# Patient Record
Sex: FEMALE | Race: WHITE | NOT HISPANIC OR LATINO | URBAN - METROPOLITAN AREA
[De-identification: names, ages, dates, MRNs, and addresses within clinical notes are randomized per-mention and may not be internally consistent; named-entity substitution may affect disease eponyms.]

---

## 2017-02-13 ENCOUNTER — APPOINTMENT (RX ONLY)
Dept: URBAN - METROPOLITAN AREA CLINIC 23 | Facility: CLINIC | Age: 82
Setting detail: DERMATOLOGY
End: 2017-02-13

## 2017-02-13 DIAGNOSIS — Z85.828 PERSONAL HISTORY OF OTHER MALIGNANT NEOPLASM OF SKIN: ICD-10-CM

## 2017-02-13 DIAGNOSIS — L82.0 INFLAMED SEBORRHEIC KERATOSIS: ICD-10-CM

## 2017-02-13 DIAGNOSIS — Z86.007 PERSONAL HISTORY OF IN-SITU NEOPLASM OF SKIN: ICD-10-CM

## 2017-02-13 PROBLEM — L85.3 XEROSIS CUTIS: Status: ACTIVE | Noted: 2017-02-13

## 2017-02-13 PROBLEM — E78.5 HYPERLIPIDEMIA, UNSPECIFIED: Status: ACTIVE | Noted: 2017-02-13

## 2017-02-13 PROBLEM — C18.9 MALIGNANT NEOPLASM OF COLON, UNSPECIFIED: Status: ACTIVE | Noted: 2017-02-13

## 2017-02-13 PROBLEM — D04.61 CARCINOMA IN SITU OF SKIN OF RIGHT UPPER LIMB, INCLUDING SHOULDER: Status: ACTIVE | Noted: 2017-02-13

## 2017-02-13 PROCEDURE — ? DEFER

## 2017-02-13 PROCEDURE — 99213 OFFICE O/P EST LOW 20 MIN: CPT

## 2017-02-13 ASSESSMENT — LOCATION ZONE DERM
LOCATION ZONE: LEG
LOCATION ZONE: SCALP

## 2017-02-13 ASSESSMENT — LOCATION DETAILED DESCRIPTION DERM
LOCATION DETAILED: LEFT MEDIAL FRONTAL SCALP
LOCATION DETAILED: LEFT DISTAL PRETIBIAL REGION
LOCATION DETAILED: LEFT ANTERIOR LATERAL DISTAL THIGH
LOCATION DETAILED: LEFT PROXIMAL PRETIBIAL REGION

## 2017-02-13 ASSESSMENT — LOCATION SIMPLE DESCRIPTION DERM
LOCATION SIMPLE: LEFT PRETIBIAL REGION
LOCATION SIMPLE: LEFT SCALP
LOCATION SIMPLE: LEFT THIGH

## 2017-02-13 NOTE — PROCEDURE: DEFER
Detail Level: Detailed
Other Procedure: 1.2cm
Other Procedure: 1.0cm
Procedure To Be Performed At Next Visit: Biopsy by shave method

## 2017-03-27 ENCOUNTER — APPOINTMENT (RX ONLY)
Dept: URBAN - METROPOLITAN AREA CLINIC 23 | Facility: CLINIC | Age: 82
Setting detail: DERMATOLOGY
End: 2017-03-27

## 2017-03-27 DIAGNOSIS — L82.0 INFLAMED SEBORRHEIC KERATOSIS: ICD-10-CM

## 2017-03-27 DIAGNOSIS — L82.1 OTHER SEBORRHEIC KERATOSIS: ICD-10-CM

## 2017-03-27 PROBLEM — L20.84 INTRINSIC (ALLERGIC) ECZEMA: Status: ACTIVE | Noted: 2017-03-27

## 2017-03-27 PROBLEM — Z85.828 PERSONAL HISTORY OF OTHER MALIGNANT NEOPLASM OF SKIN: Status: ACTIVE | Noted: 2017-03-27

## 2017-03-27 PROBLEM — E78.5 HYPERLIPIDEMIA, UNSPECIFIED: Status: ACTIVE | Noted: 2017-03-27

## 2017-03-27 PROBLEM — D04.61 CARCINOMA IN SITU OF SKIN OF RIGHT UPPER LIMB, INCLUDING SHOULDER: Status: ACTIVE | Noted: 2017-03-27

## 2017-03-27 PROCEDURE — ? SHAVE REMOVAL

## 2017-03-27 PROCEDURE — ? COUNSELING

## 2017-03-27 PROCEDURE — 11302 SHAVE SKIN LESION 1.1-2.0 CM: CPT

## 2017-03-27 PROCEDURE — ? SHAVE REMOVAL AND DESTRUCTION

## 2017-03-27 PROCEDURE — 17262 DSTRJ MAL LES T/A/L 1.1-2.0: CPT

## 2017-03-27 PROCEDURE — 99213 OFFICE O/P EST LOW 20 MIN: CPT | Mod: 25

## 2017-03-27 ASSESSMENT — LOCATION ZONE DERM
LOCATION ZONE: LEG
LOCATION ZONE: TRUNK

## 2017-03-27 ASSESSMENT — LOCATION DETAILED DESCRIPTION DERM
LOCATION DETAILED: LEFT ANTERIOR LATERAL DISTAL THIGH
LOCATION DETAILED: LEFT MID-UPPER BACK

## 2017-03-27 ASSESSMENT — LOCATION SIMPLE DESCRIPTION DERM
LOCATION SIMPLE: LEFT UPPER BACK
LOCATION SIMPLE: LEFT THIGH

## 2017-03-27 NOTE — PROCEDURE: SHAVE REMOVAL AND DESTRUCTION
Bill As?: Note: Bill Malignant Destruction If Path Confirms Malignant Lesion. Only Bill As Shave Removal If Path Comes Back Benign. Do Not Bill Shave Removal On Malignant Lesions.: Malignant Destruction
Detail Level: Detailed
Anesthesia Volume In Cc: 0.5
Anesthesia Type: 1% lidocaine with epinephrine and a 1:10 solution of 8.4% sodium bicarbonate
Bill For Surgical Tray: no
Cautery Type: electrodesiccation
Size Of Lesion In Cm: 1
Number Of Curettages: 4
Notification Instructions: Patient will be notified of biopsy results. However, patient instructed to call the office if not contacted within 2 weeks.
Consent: Written consent was obtained and risks were reviewed including but not limited to scarring, infection, bleeding, scabbing, incomplete removal, nerve damage and allergy to anesthesia.
Size After Destruction (Required For Destruction Billing): 1.2
Hemostasis: Aluminum Chloride and Electrocautery
Accession #: [id number]
Dressing: [email or web address]
Post-Care Instructions: I reviewed with the patient in detail post-care instructions. Patient is to keep the biopsy site dry overnight, and then apply bacitracin twice daily until healed. Patient may apply hydrogen peroxide soaks to remove any crusting.
Billing Type: Third-Party Bill
Wound Care: Vaseline

## 2017-03-27 NOTE — PROCEDURE: SHAVE REMOVAL
Post-Care Instructions: I reviewed with the patient in detail post-care instructions. Patient is to keep the biopsy site dry overnight, and then apply bacitracin twice daily until healed. Patient may apply hydrogen peroxide soaks to remove any crusting.
Accession #: [id number]
Wound Care: Gelfoam
Notification Instructions: Patient will be notified of biopsy results.
Bill For Surgical Tray: no
Medical Necessity Clause: This procedure was medically necessary because the lesion that was treated was: rubbed by clothing
Detail Level: Detailed
Medical Necessity Information: It is in your best interest to select a reason for this procedure from the list below. All of these items fulfill various CMS LCD requirements except the new and changing color options.
Billing Type: Third-Party Bill
Hemostasis: Aluminum Chloride and Electrocautery
Anesthesia Type: 1% lidocaine with 1:100,000 epinephrine and a 1:10 solution of 8.4% sodium bicarbonate
X Size Of Lesion In Cm (Optional): 0
Anesthesia Volume In Cc: 0.5
Biopsy Method: 15 blade
Consent was obtained from the patient. The risks and benefits to therapy were discussed in detail. Specifically, the risks of infection, scarring, bleeding, prolonged wound healing, incomplete removal, allergy to anesthesia, nerve injury and recurrence were addressed. Prior to the procedure, the treatment site was clearly identified and confirmed by the patient.
Size Of Lesion In Cm (Required): 1.2

## 2017-04-24 ENCOUNTER — APPOINTMENT (RX ONLY)
Dept: URBAN - METROPOLITAN AREA CLINIC 23 | Facility: CLINIC | Age: 82
Setting detail: DERMATOLOGY
End: 2017-04-24

## 2017-04-24 DIAGNOSIS — Z85.828 PERSONAL HISTORY OF OTHER MALIGNANT NEOPLASM OF SKIN: ICD-10-CM

## 2017-04-24 DIAGNOSIS — L82.0 INFLAMED SEBORRHEIC KERATOSIS: ICD-10-CM

## 2017-04-24 DIAGNOSIS — Z86.007 PERSONAL HISTORY OF IN-SITU NEOPLASM OF SKIN: ICD-10-CM

## 2017-04-24 PROBLEM — E78.5 HYPERLIPIDEMIA, UNSPECIFIED: Status: ACTIVE | Noted: 2017-04-24

## 2017-04-24 PROBLEM — L85.3 XEROSIS CUTIS: Status: ACTIVE | Noted: 2017-04-24

## 2017-04-24 PROCEDURE — ? OTHER

## 2017-04-24 PROCEDURE — 99213 OFFICE O/P EST LOW 20 MIN: CPT

## 2017-04-24 ASSESSMENT — LOCATION DETAILED DESCRIPTION DERM
LOCATION DETAILED: LEFT PROXIMAL PRETIBIAL REGION
LOCATION DETAILED: RIGHT ANTERIOR PROXIMAL UPPER ARM
LOCATION DETAILED: LEFT MEDIAL FRONTAL SCALP
LOCATION DETAILED: LEFT ANTERIOR LATERAL DISTAL THIGH
LOCATION DETAILED: LEFT DISTAL PRETIBIAL REGION

## 2017-04-24 ASSESSMENT — LOCATION ZONE DERM
LOCATION ZONE: ARM
LOCATION ZONE: SCALP
LOCATION ZONE: LEG

## 2017-04-24 ASSESSMENT — LOCATION SIMPLE DESCRIPTION DERM
LOCATION SIMPLE: LEFT SCALP
LOCATION SIMPLE: LEFT PRETIBIAL REGION
LOCATION SIMPLE: LEFT THIGH
LOCATION SIMPLE: RIGHT UPPER ARM

## 2017-04-24 NOTE — PROCEDURE: MIPS QUALITY
Quality 111:Pneumonia Vaccination Status For Older Adults: Pneumococcal Vaccination Previously Received
Quality 130: Documentation Of Current Medications In The Medical Record: Current Medications Documented
Detail Level: Detailed
Quality 110: Preventive Care And Screening: Influenza Immunization: Influenza Immunization Administered during Influenza season

## 2017-04-24 NOTE — PROCEDURE: REASSURANCE
Additional Notes (Optional): Bx site proven Irritated SK. Bx site healing well
Detail Level: Detailed

## 2017-04-24 NOTE — PROCEDURE: OTHER
Note Text (......Xxx Chief Complaint.): This diagnosis correlates with the
Other (Free Text): Bx site proven SCCis. Bx site healing well
Detail Level: Detailed

## 2017-08-03 ENCOUNTER — APPOINTMENT (RX ONLY)
Dept: URBAN - METROPOLITAN AREA CLINIC 23 | Facility: CLINIC | Age: 82
Setting detail: DERMATOLOGY
End: 2017-08-03

## 2017-08-03 DIAGNOSIS — L57.0 ACTINIC KERATOSIS: ICD-10-CM

## 2017-08-03 DIAGNOSIS — L20.89 OTHER ATOPIC DERMATITIS: ICD-10-CM | Status: WELL CONTROLLED

## 2017-08-03 DIAGNOSIS — Z85.828 PERSONAL HISTORY OF OTHER MALIGNANT NEOPLASM OF SKIN: ICD-10-CM

## 2017-08-03 DIAGNOSIS — Z86.007 PERSONAL HISTORY OF IN-SITU NEOPLASM OF SKIN: ICD-10-CM

## 2017-08-03 PROBLEM — L20.84 INTRINSIC (ALLERGIC) ECZEMA: Status: ACTIVE | Noted: 2017-08-03

## 2017-08-03 PROBLEM — C18.9 MALIGNANT NEOPLASM OF COLON, UNSPECIFIED: Status: ACTIVE | Noted: 2017-08-03

## 2017-08-03 PROBLEM — H91.90 UNSPECIFIED HEARING LOSS, UNSPECIFIED EAR: Status: ACTIVE | Noted: 2017-08-03

## 2017-08-03 PROCEDURE — ? TREATMENT REGIMEN

## 2017-08-03 PROCEDURE — ? COUNSELING

## 2017-08-03 PROCEDURE — 17000 DESTRUCT PREMALG LESION: CPT

## 2017-08-03 PROCEDURE — 17003 DESTRUCT PREMALG LES 2-14: CPT

## 2017-08-03 PROCEDURE — 99213 OFFICE O/P EST LOW 20 MIN: CPT | Mod: 25

## 2017-08-03 PROCEDURE — ? LIQUID NITROGEN

## 2017-08-03 PROCEDURE — ? PRESCRIPTION

## 2017-08-03 RX ORDER — TRIAMCINOLONE ACETONIDE 1 MG/G
CREAM TOPICAL
Qty: 1 | Refills: 2 | Status: ERX

## 2017-08-03 ASSESSMENT — LOCATION DETAILED DESCRIPTION DERM
LOCATION DETAILED: LEFT LATERAL DISTAL PRETIBIAL REGION
LOCATION DETAILED: LEFT MEDIAL FRONTAL SCALP
LOCATION DETAILED: LEFT PROXIMAL PRETIBIAL REGION
LOCATION DETAILED: RIGHT DISTAL PRETIBIAL REGION
LOCATION DETAILED: RIGHT ANTERIOR PROXIMAL UPPER ARM
LOCATION DETAILED: LEFT DISTAL PRETIBIAL REGION
LOCATION DETAILED: LEFT LATERAL PROXIMAL PRETIBIAL REGION
LOCATION DETAILED: RIGHT PROXIMAL RADIAL DORSAL FOREARM

## 2017-08-03 ASSESSMENT — LOCATION ZONE DERM
LOCATION ZONE: SCALP
LOCATION ZONE: ARM
LOCATION ZONE: LEG

## 2017-08-03 ASSESSMENT — LOCATION SIMPLE DESCRIPTION DERM
LOCATION SIMPLE: RIGHT PRETIBIAL REGION
LOCATION SIMPLE: RIGHT UPPER ARM
LOCATION SIMPLE: LEFT SCALP
LOCATION SIMPLE: LEFT PRETIBIAL REGION
LOCATION SIMPLE: RIGHT FOREARM

## 2017-08-03 NOTE — PROCEDURE: TREATMENT REGIMEN
Detail Level: Detailed
Samples Given: Eczema calming body moisturizer apply BID PRN
Continue Regimen: Triamcinolone cream (apply BID, pen, to affected areas on lower legs)

## 2018-02-21 ENCOUNTER — APPOINTMENT (RX ONLY)
Dept: URBAN - METROPOLITAN AREA CLINIC 23 | Facility: CLINIC | Age: 83
Setting detail: DERMATOLOGY
End: 2018-02-21

## 2018-02-21 DIAGNOSIS — L20.89 OTHER ATOPIC DERMATITIS: ICD-10-CM

## 2018-02-21 DIAGNOSIS — L82.1 OTHER SEBORRHEIC KERATOSIS: ICD-10-CM

## 2018-02-21 DIAGNOSIS — Z86.007 PERSONAL HISTORY OF IN-SITU NEOPLASM OF SKIN: ICD-10-CM

## 2018-02-21 DIAGNOSIS — L57.0 ACTINIC KERATOSIS: ICD-10-CM

## 2018-02-21 DIAGNOSIS — Z85.828 PERSONAL HISTORY OF OTHER MALIGNANT NEOPLASM OF SKIN: ICD-10-CM

## 2018-02-21 PROBLEM — L20.84 INTRINSIC (ALLERGIC) ECZEMA: Status: ACTIVE | Noted: 2018-02-21

## 2018-02-21 PROBLEM — H91.90 UNSPECIFIED HEARING LOSS, UNSPECIFIED EAR: Status: ACTIVE | Noted: 2018-02-21

## 2018-02-21 PROBLEM — E78.5 HYPERLIPIDEMIA, UNSPECIFIED: Status: ACTIVE | Noted: 2018-02-21

## 2018-02-21 PROBLEM — D23.5 OTHER BENIGN NEOPLASM OF SKIN OF TRUNK: Status: ACTIVE | Noted: 2018-02-21

## 2018-02-21 PROCEDURE — 17003 DESTRUCT PREMALG LES 2-14: CPT

## 2018-02-21 PROCEDURE — 17000 DESTRUCT PREMALG LESION: CPT

## 2018-02-21 PROCEDURE — ? COUNSELING

## 2018-02-21 PROCEDURE — ? PRESCRIPTION

## 2018-02-21 PROCEDURE — 99214 OFFICE O/P EST MOD 30 MIN: CPT | Mod: 25

## 2018-02-21 PROCEDURE — ? LIQUID NITROGEN

## 2018-02-21 RX ORDER — TRIAMCINOLONE ACETONIDE 1 MG/G
CREAM TOPICAL
Qty: 1 | Refills: 2 | Status: ERX

## 2018-02-21 ASSESSMENT — LOCATION ZONE DERM
LOCATION ZONE: TRUNK
LOCATION ZONE: SCALP
LOCATION ZONE: ARM
LOCATION ZONE: HAND
LOCATION ZONE: LEG

## 2018-02-21 ASSESSMENT — LOCATION SIMPLE DESCRIPTION DERM
LOCATION SIMPLE: RIGHT SHOULDER
LOCATION SIMPLE: RIGHT UPPER ARM
LOCATION SIMPLE: CHEST
LOCATION SIMPLE: RIGHT UPPER BACK
LOCATION SIMPLE: RIGHT PRETIBIAL REGION
LOCATION SIMPLE: LEFT SCALP
LOCATION SIMPLE: LEFT UPPER BACK
LOCATION SIMPLE: LEFT PRETIBIAL REGION
LOCATION SIMPLE: LEFT HAND

## 2018-02-21 ASSESSMENT — LOCATION DETAILED DESCRIPTION DERM
LOCATION DETAILED: LEFT SUPERIOR UPPER BACK
LOCATION DETAILED: LEFT MEDIAL FRONTAL SCALP
LOCATION DETAILED: RIGHT PROXIMAL PRETIBIAL REGION
LOCATION DETAILED: RIGHT ANTERIOR PROXIMAL UPPER ARM
LOCATION DETAILED: RIGHT ANTERIOR SHOULDER
LOCATION DETAILED: LEFT DISTAL PRETIBIAL REGION
LOCATION DETAILED: LEFT RADIAL DORSAL HAND
LOCATION DETAILED: RIGHT DISTAL PRETIBIAL REGION
LOCATION DETAILED: STERNAL NOTCH
LOCATION DETAILED: RIGHT INFERIOR UPPER BACK
LOCATION DETAILED: LEFT PROXIMAL PRETIBIAL REGION

## 2018-02-21 NOTE — PROCEDURE: LIQUID NITROGEN
Detail Level: Detailed
Duration Of Freeze Thaw-Cycle (Seconds): 20
Number Of Freeze-Thaw Cycles: 1 freeze-thaw cycle
Render Post-Care Instructions In Note?: yes
Consent: The patient's consent was obtained including but not limited to risks of crusting, scabbing, blistering, scarring, darker or lighter pigmentary change, recurrence, incomplete removal and infection.
Post-Care Instructions: I reviewed with the patient in detail post-care instructions. Patient is to wear sunprotection, and avoid picking at any of the treated lesions. Pt may apply Vaseline to crusted or scabbing areas.

## 2019-02-20 ENCOUNTER — APPOINTMENT (RX ONLY)
Dept: URBAN - METROPOLITAN AREA CLINIC 23 | Facility: CLINIC | Age: 84
Setting detail: DERMATOLOGY
End: 2019-02-20

## 2019-02-20 DIAGNOSIS — L82.1 OTHER SEBORRHEIC KERATOSIS: ICD-10-CM

## 2019-02-20 DIAGNOSIS — L57.0 ACTINIC KERATOSIS: ICD-10-CM

## 2019-02-20 DIAGNOSIS — Z85.828 PERSONAL HISTORY OF OTHER MALIGNANT NEOPLASM OF SKIN: ICD-10-CM

## 2019-02-20 DIAGNOSIS — L71.8 OTHER ROSACEA: ICD-10-CM

## 2019-02-20 PROBLEM — C18.9 MALIGNANT NEOPLASM OF COLON, UNSPECIFIED: Status: ACTIVE | Noted: 2019-02-20

## 2019-02-20 PROBLEM — L20.84 INTRINSIC (ALLERGIC) ECZEMA: Status: ACTIVE | Noted: 2019-02-20

## 2019-02-20 PROBLEM — D23.5 OTHER BENIGN NEOPLASM OF SKIN OF TRUNK: Status: ACTIVE | Noted: 2019-02-20

## 2019-02-20 PROCEDURE — ? PRESCRIPTION

## 2019-02-20 PROCEDURE — ? DIAGNOSIS COMMENT

## 2019-02-20 PROCEDURE — ? COUNSELING

## 2019-02-20 PROCEDURE — ? LIQUID NITROGEN

## 2019-02-20 PROCEDURE — 99213 OFFICE O/P EST LOW 20 MIN: CPT | Mod: 25

## 2019-02-20 PROCEDURE — ? TREATMENT REGIMEN

## 2019-02-20 PROCEDURE — 17000 DESTRUCT PREMALG LESION: CPT

## 2019-02-20 PROCEDURE — 17003 DESTRUCT PREMALG LES 2-14: CPT

## 2019-02-20 RX ORDER — METRONIDAZOLE 7.5 MG/G
CREAM TOPICAL
Qty: 1 | Refills: 11 | Status: ERX | COMMUNITY
Start: 2019-02-20

## 2019-02-20 RX ADMIN — METRONIDAZOLE: 7.5 CREAM TOPICAL at 14:30

## 2019-02-20 ASSESSMENT — LOCATION SIMPLE DESCRIPTION DERM
LOCATION SIMPLE: LEFT CHEEK
LOCATION SIMPLE: CHEST
LOCATION SIMPLE: RIGHT UPPER BACK
LOCATION SIMPLE: LEFT FOREARM
LOCATION SIMPLE: RIGHT UPPER ARM
LOCATION SIMPLE: LEFT UPPER BACK
LOCATION SIMPLE: LEFT SCALP
LOCATION SIMPLE: RIGHT SHOULDER
LOCATION SIMPLE: LEFT PRETIBIAL REGION

## 2019-02-20 ASSESSMENT — LOCATION DETAILED DESCRIPTION DERM
LOCATION DETAILED: STERNAL NOTCH
LOCATION DETAILED: LEFT SUPERIOR UPPER BACK
LOCATION DETAILED: LEFT PROXIMAL PRETIBIAL REGION
LOCATION DETAILED: RIGHT ANTERIOR PROXIMAL UPPER ARM
LOCATION DETAILED: LEFT MEDIAL FRONTAL SCALP
LOCATION DETAILED: RIGHT INFERIOR UPPER BACK
LOCATION DETAILED: RIGHT ANTERIOR SHOULDER
LOCATION DETAILED: LEFT DISTAL PRETIBIAL REGION
LOCATION DETAILED: LEFT DISTAL ULNAR DORSAL FOREARM
LOCATION DETAILED: LEFT MEDIAL MALAR CHEEK

## 2019-02-20 ASSESSMENT — LOCATION ZONE DERM
LOCATION ZONE: TRUNK
LOCATION ZONE: LEG
LOCATION ZONE: ARM
LOCATION ZONE: FACE
LOCATION ZONE: SCALP

## 2019-02-20 NOTE — PROCEDURE: DIAGNOSIS COMMENT
Detail Level: Simple
Comment: Advised to monitor area on left cheek and if not resolved with cryotherapy then RTC

## 2019-02-20 NOTE — PROCEDURE: LIQUID NITROGEN
Render Post-Care Instructions In Note?: yes
Detail Level: Detailed
Duration Of Freeze Thaw-Cycle (Seconds): 20
Consent: The patient's consent was obtained including but not limited to risks of crusting, scabbing, blistering, scarring, darker or lighter pigmentary change, recurrence, incomplete removal and infection.
Number Of Freeze-Thaw Cycles: 1 freeze-thaw cycle
Post-Care Instructions: I reviewed with the patient in detail post-care instructions. Patient is to wear sunprotection, and avoid picking at any of the treated lesions. Pt may apply Vaseline to crusted or scabbing areas.

## 2020-02-20 ENCOUNTER — APPOINTMENT (RX ONLY)
Dept: URBAN - METROPOLITAN AREA CLINIC 23 | Facility: CLINIC | Age: 85
Setting detail: DERMATOLOGY
End: 2020-02-20

## 2020-02-20 DIAGNOSIS — L82.1 OTHER SEBORRHEIC KERATOSIS: ICD-10-CM

## 2020-02-20 DIAGNOSIS — D18.0 HEMANGIOMA: ICD-10-CM

## 2020-02-20 DIAGNOSIS — L70.8 OTHER ACNE: ICD-10-CM

## 2020-02-20 DIAGNOSIS — Z85.828 PERSONAL HISTORY OF OTHER MALIGNANT NEOPLASM OF SKIN: ICD-10-CM

## 2020-02-20 DIAGNOSIS — L57.0 ACTINIC KERATOSIS: ICD-10-CM

## 2020-02-20 DIAGNOSIS — L30.9 DERMATITIS, UNSPECIFIED: ICD-10-CM

## 2020-02-20 PROBLEM — H91.90 UNSPECIFIED HEARING LOSS, UNSPECIFIED EAR: Status: ACTIVE | Noted: 2020-02-20

## 2020-02-20 PROBLEM — L85.3 XEROSIS CUTIS: Status: ACTIVE | Noted: 2020-02-20

## 2020-02-20 PROBLEM — D18.01 HEMANGIOMA OF SKIN AND SUBCUTANEOUS TISSUE: Status: ACTIVE | Noted: 2020-02-20

## 2020-02-20 PROBLEM — C18.9 MALIGNANT NEOPLASM OF COLON, UNSPECIFIED: Status: ACTIVE | Noted: 2020-02-20

## 2020-02-20 PROBLEM — D23.5 OTHER BENIGN NEOPLASM OF SKIN OF TRUNK: Status: ACTIVE | Noted: 2020-02-20

## 2020-02-20 PROCEDURE — 99214 OFFICE O/P EST MOD 30 MIN: CPT | Mod: 25

## 2020-02-20 PROCEDURE — ? DIAGNOSIS COMMENT

## 2020-02-20 PROCEDURE — ? LIQUID NITROGEN

## 2020-02-20 PROCEDURE — ? OTHER

## 2020-02-20 PROCEDURE — ? COUNSELING

## 2020-02-20 PROCEDURE — ? KOH PREP

## 2020-02-20 PROCEDURE — 17000 DESTRUCT PREMALG LESION: CPT

## 2020-02-20 ASSESSMENT — LOCATION SIMPLE DESCRIPTION DERM
LOCATION SIMPLE: LEFT PRETIBIAL REGION
LOCATION SIMPLE: RIGHT SHOULDER
LOCATION SIMPLE: RIGHT HAND
LOCATION SIMPLE: RIGHT FOOT
LOCATION SIMPLE: RIGHT UPPER ARM
LOCATION SIMPLE: LEFT SCALP
LOCATION SIMPLE: RIGHT PRETIBIAL REGION
LOCATION SIMPLE: RIGHT UPPER BACK
LOCATION SIMPLE: CHEST
LOCATION SIMPLE: ABDOMEN
LOCATION SIMPLE: LEFT UPPER BACK

## 2020-02-20 ASSESSMENT — LOCATION DETAILED DESCRIPTION DERM
LOCATION DETAILED: LEFT SUPERIOR UPPER BACK
LOCATION DETAILED: LEFT MEDIAL FRONTAL SCALP
LOCATION DETAILED: LEFT RIB CAGE
LOCATION DETAILED: STERNAL NOTCH
LOCATION DETAILED: RIGHT RADIAL DORSAL HAND
LOCATION DETAILED: RIGHT DORSAL FOOT
LOCATION DETAILED: RIGHT INFERIOR UPPER BACK
LOCATION DETAILED: LEFT PROXIMAL PRETIBIAL REGION
LOCATION DETAILED: RIGHT PROXIMAL PRETIBIAL REGION
LOCATION DETAILED: RIGHT ANTERIOR SHOULDER
LOCATION DETAILED: RIGHT ANTERIOR PROXIMAL UPPER ARM
LOCATION DETAILED: LEFT DISTAL PRETIBIAL REGION

## 2020-02-20 ASSESSMENT — LOCATION ZONE DERM
LOCATION ZONE: TRUNK
LOCATION ZONE: LEG
LOCATION ZONE: HAND
LOCATION ZONE: SCALP
LOCATION ZONE: FEET
LOCATION ZONE: ARM

## 2020-02-20 NOTE — PROCEDURE: OTHER
Detail Level: Zone
Other (Free Text): Pt has treated with oral abx and topical antifungals. Will treat proactively with otc Lamisil bid x 2weeks. Will recheck in 2 weeks.\\n\\nIf not resolved will treat with steroids.
Note Text (......Xxx Chief Complaint.): This diagnosis correlates with the

## 2020-02-20 NOTE — PROCEDURE: LIQUID NITROGEN
Duration Of Freeze Thaw-Cycle (Seconds): 20
Detail Level: Detailed
Render Note In Bullet Format When Appropriate: No
Number Of Freeze-Thaw Cycles: 1 freeze-thaw cycle
Post-Care Instructions: I reviewed with the patient in detail post-care instructions. Patient is to wear sunprotection, and avoid picking at any of the treated lesions. Pt may apply Vaseline to crusted or scabbing areas.
Consent: The patient's consent was obtained including but not limited to risks of crusting, scabbing, blistering, scarring, darker or lighter pigmentary change, recurrence, incomplete removal and infection.
Render Post-Care Instructions In Note?: yes

## 2020-02-20 NOTE — PROCEDURE: DIAGNOSIS COMMENT
Comment: AISLINN is negative however, still suspicious for tinea. TX with topicals, it not resolved, lidex cream for two weeks.
Detail Level: Simple
Comment: Will recheck in 2 weeks.

## 2020-05-30 NOTE — PROCEDURE: COUNSELING
Neck stiffness throughout day    Also drives forklift     Fell out of bed in December with dream  Felt hurt neck then    covid negative screening    Patient requested in office with Dr Huang    Scheduled 6-5     Detail Level: Detailed

## 2020-06-11 ENCOUNTER — APPOINTMENT (RX ONLY)
Dept: URBAN - METROPOLITAN AREA CLINIC 23 | Facility: CLINIC | Age: 85
Setting detail: DERMATOLOGY
End: 2020-06-11

## 2020-06-11 DIAGNOSIS — B35.3 TINEA PEDIS: ICD-10-CM

## 2020-06-11 DIAGNOSIS — I87.2 VENOUS INSUFFICIENCY (CHRONIC) (PERIPHERAL): ICD-10-CM

## 2020-06-11 PROBLEM — L57.0 ACTINIC KERATOSIS: Status: ACTIVE | Noted: 2020-06-11

## 2020-06-11 PROBLEM — C18.9 MALIGNANT NEOPLASM OF COLON, UNSPECIFIED: Status: ACTIVE | Noted: 2020-06-11

## 2020-06-11 PROCEDURE — 99213 OFFICE O/P EST LOW 20 MIN: CPT

## 2020-06-11 PROCEDURE — ? TREATMENT REGIMEN

## 2020-06-11 PROCEDURE — ? PRESCRIPTION

## 2020-06-11 PROCEDURE — ? COUNSELING

## 2020-06-11 RX ORDER — TRIAMCINOLONE ACETONIDE 1 MG/G
CREAM TOPICAL
Qty: 1 | Refills: 2 | COMMUNITY
Start: 2020-06-11

## 2020-06-11 RX ORDER — MUPIROCIN 20 MG/G
OINTMENT TOPICAL
Qty: 1 | Refills: 2 | COMMUNITY
Start: 2020-06-11

## 2020-06-11 RX ORDER — KETOCONAZOLE 20 MG/G
CREAM TOPICAL
Qty: 1 | Refills: 2 | COMMUNITY
Start: 2020-06-11

## 2020-06-11 RX ADMIN — MUPIROCIN: 20 OINTMENT TOPICAL at 00:00

## 2020-06-11 RX ADMIN — KETOCONAZOLE: 20 CREAM TOPICAL at 00:00

## 2020-06-11 RX ADMIN — TRIAMCINOLONE ACETONIDE: 1 CREAM TOPICAL at 00:00

## 2020-06-11 ASSESSMENT — LOCATION DETAILED DESCRIPTION DERM
LOCATION DETAILED: RIGHT DISTAL PRETIBIAL REGION
LOCATION DETAILED: LEFT MEDIAL 5TH TOE
LOCATION DETAILED: 1ST WEBSPACE LEFT FOOT
LOCATION DETAILED: 3RD WEBSPACE RIGHT FOOT
LOCATION DETAILED: 1ST WEBSPACE RIGHT FOOT
LOCATION DETAILED: LEFT DORSAL FOOT
LOCATION DETAILED: 2ND WEBSPACE RIGHT FOOT
LOCATION DETAILED: RIGHT MEDIAL 5TH TOE
LOCATION DETAILED: LEFT DISTAL PRETIBIAL REGION
LOCATION DETAILED: 2ND WEBSPACE LEFT FOOT
LOCATION DETAILED: LEFT MEDIAL 4TH TOE
LOCATION DETAILED: RIGHT DORSAL FOOT

## 2020-06-11 ASSESSMENT — LOCATION SIMPLE DESCRIPTION DERM
LOCATION SIMPLE: LEFT 4TH TOE
LOCATION SIMPLE: LEFT 5TH TOE
LOCATION SIMPLE: RIGHT PRETIBIAL REGION
LOCATION SIMPLE: LEFT FOOT
LOCATION SIMPLE: RIGHT FOOT
LOCATION SIMPLE: RIGHT 5TH TOE
LOCATION SIMPLE: LEFT PRETIBIAL REGION

## 2020-06-11 ASSESSMENT — LOCATION ZONE DERM
LOCATION ZONE: TOE
LOCATION ZONE: FEET
LOCATION ZONE: LEG

## 2020-07-27 NOTE — PROCEDURE: TREATMENT REGIMEN
Patient's son given update on patient's status.
TRANSFER - OUT REPORT:    Verbal report given to [facility] KULDEEP RN(name) on Mireya You  being transferred to [address] [place] [address](unit) for routine post - op       Report consisted of patients Situation, Background, Assessment and   Recommendations(SBAR). Information from the following report(s) SBAR, Kardex, OR Summary, Procedure Summary, Intake/Output, MAR, Recent Results and Med Rec Status was reviewed with the receiving nurse. Lines:   Peripheral IV 07/27/20 Right Wrist (Active)   Site Assessment Clean, dry, & intact 07/27/20 1204   Phlebitis Assessment 0 07/27/20 1204   Infiltration Assessment 0 07/27/20 1204   Dressing Status Clean, dry, & intact 07/27/20 1204   Dressing Type Tape;Transparent 07/27/20 1204   Hub Color/Line Status Pink; Infusing 07/27/20 1204        Opportunity for questions and clarification was provided. Patient transported with:   O2 @ 2 liters  Tech   Prescriptions for discharge.
Detail Level: Zone
Discontinue Regimen: Triamcinolone and Hydrocortisone cream
Initiate Treatment: Metrocream
Plan: Recommended green tinted makeup

## 2021-02-10 ENCOUNTER — APPOINTMENT (OUTPATIENT)
Dept: GENERAL RADIOLOGY | Age: 86
DRG: 494 | End: 2021-02-10
Attending: EMERGENCY MEDICINE
Payer: MEDICARE

## 2021-02-10 ENCOUNTER — HOSPITAL ENCOUNTER (EMERGENCY)
Age: 86
Discharge: HOME | DRG: 494 | End: 2021-02-10
Attending: EMERGENCY MEDICINE
Payer: MEDICARE

## 2021-02-10 VITALS
TEMPERATURE: 98.2 F | BODY MASS INDEX: 27.64 KG/M2 | HEIGHT: 66 IN | RESPIRATION RATE: 16 BRPM | SYSTOLIC BLOOD PRESSURE: 183 MMHG | HEART RATE: 86 BPM | WEIGHT: 172 LBS | DIASTOLIC BLOOD PRESSURE: 70 MMHG | OXYGEN SATURATION: 98 %

## 2021-02-10 DIAGNOSIS — S82.891A CLOSED FRACTURE DISLOCATION OF RIGHT ANKLE, INITIAL ENCOUNTER: Primary | ICD-10-CM

## 2021-02-10 DIAGNOSIS — S82.811A CLOSED TORUS FRACTURE OF PROXIMAL END OF RIGHT FIBULA, INITIAL ENCOUNTER: ICD-10-CM

## 2021-02-10 PROCEDURE — 75810000053 HC SPLINT APPLICATION

## 2021-02-10 PROCEDURE — 96372 THER/PROPH/DIAG INJ SC/IM: CPT

## 2021-02-10 PROCEDURE — 74011250636 HC RX REV CODE- 250/636: Performed by: EMERGENCY MEDICINE

## 2021-02-10 PROCEDURE — 99283 EMERGENCY DEPT VISIT LOW MDM: CPT

## 2021-02-10 PROCEDURE — 73610 X-RAY EXAM OF ANKLE: CPT

## 2021-02-10 PROCEDURE — 73590 X-RAY EXAM OF LOWER LEG: CPT

## 2021-02-10 RX ORDER — FENTANYL CITRATE 50 UG/ML
100 INJECTION, SOLUTION INTRAMUSCULAR; INTRAVENOUS ONCE
Status: COMPLETED | OUTPATIENT
Start: 2021-02-10 | End: 2021-02-10

## 2021-02-10 RX ORDER — OXYCODONE AND ACETAMINOPHEN 5; 325 MG/1; MG/1
1 TABLET ORAL
Qty: 20 TAB | Refills: 0 | Status: SHIPPED | OUTPATIENT
Start: 2021-02-10 | End: 2021-02-15

## 2021-02-10 RX ADMIN — FENTANYL CITRATE 100 MCG: 50 INJECTION, SOLUTION INTRAMUSCULAR; INTRAVENOUS at 14:38

## 2021-02-10 NOTE — ED PROVIDER NOTES
51-year-old female presenting for right knee and ankle pain after a fall. States that she was walking in her home when she slipped and fell straight down. She crumpled to a seated position with her right leg beneath her. She was then unable to get back up. Called EMS who transported her here. Obvious deformity of the right ankle. Pain along the knee as well. Denies hip back or neck pain. Did not pass out. No chest pain nausea vomiting prior to the event. The history is provided by the patient. Ankle Pain   Pertinent negatives include no back pain and no neck pain. No past medical history on file. No past surgical history on file. No family history on file.     Social History     Socioeconomic History    Marital status:      Spouse name: Not on file    Number of children: Not on file    Years of education: Not on file    Highest education level: Not on file   Occupational History    Not on file   Social Needs    Financial resource strain: Not on file    Food insecurity     Worry: Not on file     Inability: Not on file    Transportation needs     Medical: Not on file     Non-medical: Not on file   Tobacco Use    Smoking status: Not on file   Substance and Sexual Activity    Alcohol use: Not on file    Drug use: Not on file    Sexual activity: Not on file   Lifestyle    Physical activity     Days per week: Not on file     Minutes per session: Not on file    Stress: Not on file   Relationships    Social connections     Talks on phone: Not on file     Gets together: Not on file     Attends [employer] service: Not on file     Active member of club or organization: Not on file     Attends meetings of clubs or organizations: Not on file     Relationship status: Not on file    Intimate partner violence     Fear of current or ex partner: Not on file     Emotionally abused: Not on file     Physically abused: Not on file     Forced sexual activity: Not on file   Other Topics Concern    Not on file   Social History Narrative    Not on file         ALLERGIES: Patient has no known allergies. Review of Systems   Musculoskeletal: Positive for arthralgias, gait problem and joint swelling. Negative for back pain, neck pain and neck stiffness. All other systems reviewed and are negative. Vitals:    02/10/21 1413   BP: (!) 188/77   Pulse: 80   Resp: 16   Temp: 98.2 °F (36.8 °C)   SpO2: 97%   Weight: 78 kg (172 lb)   Height: 5' 6\" (1.676 m)            Physical Exam  Vitals signs and nursing note reviewed. Constitutional:       Appearance: She is well-developed. HENT:      Head: Normocephalic and atraumatic. Eyes:      Conjunctiva/sclera: Conjunctivae normal.      Pupils: Pupils are equal, round, and reactive to light. Neck:      Musculoskeletal: Normal range of motion and neck supple. Cardiovascular:      Rate and Rhythm: Normal rate and regular rhythm. Pulmonary:      Effort: Pulmonary effort is normal.      Breath sounds: Normal breath sounds. Abdominal:      General: Bowel sounds are normal.      Palpations: Abdomen is soft. Musculoskeletal: Normal range of motion. General: Swelling, tenderness and signs of injury present. Right lower leg: Edema present. Comments: Obvious deformity of the right ankle, tenderness and crepitus around the proximal tib-fib of the right   Skin:     General: Skin is warm and dry. Neurological:      Mental Status: She is alert and oriented to person, place, and time. MDM  Number of Diagnoses or Management Options  Closed fracture dislocation of right ankle, initial encounter  Closed torus fracture of proximal end of right fibula, initial encounter  Diagnosis management comments: Pleasant 63-year-old female presenting for right ankle and lower extremity injury.   X-ray was performed prior to my evaluation which shows a fracture dislocation of the ankle but she also has pain and crepitus near the right knee so we will get a tib-fib x-ray as well. Amount and/or Complexity of Data Reviewed  Tests in the radiology section of CPT®: ordered and reviewed (Xr Tib/fib Rt    Result Date: 2/10/2021  Right tibia and fibula CLINICAL INDICATION proximal lower leg pain after a fall, known fracture dislocation at the ankle FINDINGS: Four views of the right tibia and fibula submitted. There is oblique, nondisplaced fracture through the proximal metadiaphysis of the femur. The fracture dislocation is again noted at the distal tibiotalar joint. 1. Oblique, nondisplaced fracture through the proximal fibula metadiaphysis. 2. Fracture dislocation at the right ankle    Xr Ankle Rt Ap/lat    Result Date: 2/12/2021  Examination: XR ANKLE RT AP/LAT INDICATION: post-op COMPARISON: 2/10/2021 and 2/12/2021 FINDINGS: Redemonstration of a trimalleolar fracture now status post ORIF with multiple screws. No evidence of hardware fracture or periprosthetic lucency. Overlying cast limits fine osseous detail. Diffuse soft tissue swelling. The ankle mortise is grossly within normal limits. No hardware complication following trimalleolar fracture ORIF. Xr Ankle Rt Ap/lat    Result Date: 2/12/2021  EXAMINATION: XR ANKLE RT AP/LAT HISTORY: OR 7.  TECHNIQUE: 3 fluoroscopic views of the right ankle were submitted. COMPARISON: X-ray dated 2/10/2021 FINDINGS: A pin is traversing the fracture of the distal fibula. Pins are seen fusing the distal fibula and tibia. Findings as described above. Xr Ankle Rt Min 3 V    Result Date: 2/10/2021  Right ankle CLINICAL INDICATION: Status post splinting of a right ankle fracture dislocation FINDINGS: Three views the right ankle limited by overlying splint material shows a successful reduction of the patient's tibiotalar dislocation. There is an oblique fracture line through the distal fibula and likely posterior malleolus of the distal tibia. Near-anatomic alignment is four.      Successful reduction of a left ankle fracture dislocation    Xr Ankle Rt Min 3 V    Result Date: 2/10/2021  Right ankle CLINICAL INDICATION: Right ankle pain after a fall FINDINGS: Three views of the right ankle show a fracture dislocation of the tibiotalar joint. The distal tibia is subluxed anteriorly with respect to the talar dome. There is widening to the medial aspect of the ankle mortise. An oblique fracture is noted to the distal fibular metadiaphysis. A vertically oriented fracture through the posterior malleolus of the distal tibia is also evident. Fracture dislocation of the right ankle as above.    )  Discuss the patient with other providers: yes  Independent visualization of images, tracings, or specimens: yes    Risk of Complications, Morbidity, and/or Mortality  Presenting problems: high  Diagnostic procedures: high  Management options: moderate  General comments: I personally reviewed the patient's vital signs, laboratory tests, and/or radiological findings. I discussed these findings with the patient and their significance. I answered all questions and gave the patient clear return precautions. The patient was discharged from the emergency department in stable condition        Patient Progress  Patient progress: improved         Splint, Long Leg    Date/Time: 2/10/2021 3:52 PM  Performed by: Katelyn Kaufman MD  Authorized by: Katelyn Kaufman MD     Consent:     Consent obtained:  Verbal    Consent given by:  Patient    Risks discussed:  Discoloration, numbness and pain    Alternatives discussed:  No treatment  Pre-procedure details:     Sensation:  Normal    Skin color:  Bruised  Procedure details:     Laterality:  Right    Location:  Ankle    Ankle:  R ankle    Splint type:  Long leg    Supplies:  Cotton padding, elastic bandage and plaster  Post-procedure details:     Pain:  Improved    Skin color:  Pink    Patient tolerance of procedure:   Tolerated well, no immediate complications  Splint, Inessa    Date/Time: 2/10/2021 3:53 PM  Performed by: Tracy Abdul MD  Authorized by: Tracy Abdul MD     Consent:     Consent obtained:  Verbal    Consent given by:  Patient    Risks discussed:  Discoloration    Alternatives discussed:  No treatment  Pre-procedure details:     Sensation:  Normal    Skin color:  Bruised  Procedure details:     Laterality:  Right    Location:  Ankle    Ankle:  R ankle    Strapping: no      Splint type: Ankle stirrup    Supplies:  Elastic bandage, cotton padding and plaster  Post-procedure details:     Pain:  Improved    Skin color:  Pink but bruised    Patient tolerance of procedure:   Tolerated well, no immediate complications

## 2021-02-10 NOTE — ED TRIAGE NOTES
Pt arrives via EMS from home after slip and fall on the floor this morning.  Right ankle wrapped upon arrival, swelling and bruising noted

## 2021-02-10 NOTE — DISCHARGE INSTRUCTIONS
Follow-up in clinic tomorrow. The address is provided in your paperwork. Use the prescribed pain medications as needed. Elevate the leg tonight to help with swelling.

## 2021-02-10 NOTE — ED NOTES
I have reviewed discharge instructions with the patient and caregiver. The patient and caregiver verbalized understanding. Patient left ED via Discharge Method: wheelchair to Home with family member    Opportunity for questions and clarification provided. Patient given 1 scripts. No esign        To continue your aftercare when you leave the hospital, you may receive an automated call from our care team to check in on how you are doing. This is a free service and part of our promise to provide the best care and service to meet your aftercare needs.  If you have questions, or wish to unsubscribe from this service please call [phone]. Thank you for Choosing our [facility] Emergency Department.

## 2021-02-11 ENCOUNTER — ANESTHESIA EVENT (OUTPATIENT)
Dept: SURGERY | Age: 86
DRG: 494 | End: 2021-02-11
Payer: MEDICARE

## 2021-02-11 NOTE — H&P (VIEW-ONLY)
Date of Service: 2021-02-11 PCP: ?????  
 
CC: Right ankle pain HPI: 
Prince Martinez is a 80years old female who presents today with Pain swelling and deformity of the right ankle. She basically had a fall yesterday from a standing height. She was seen in the emergency room and found to have a right displaced lateral malleolus fracture with a posterior malleolar component as well. This was treated with reduction and splinting and she is here today for follow-up. She is here with her daughter and they are just very concerned about her ability to care for herself at home. She is really been unable to stay off of the ankle and she has been in quite a bit of pain at home. She denies any problems besides her right ankle. PMH: No history of inflammatory arthritis;          
 
PSH:  see attached hard copy for past surgical history. Family HX:  No history of inflammatory arthritis; Medications:Pravastatin 40 mg 1 p.o. daily benazepril 40 mg 1 p.o. daily amlodipine 5 mg 1 p.o. daily, calcium supplement, fish oil supplement, vitamin B6 supplement Allergies: Morphine causes nausea and vomiting Review of Systems: 
General-No fever/chills, no changes in weight, no fatigue, no appetite changes. ????? 
Eyes-No vision changes ENT-No hearing changes, no difficulty swallowing CV-No chest pain, no palpitations RESP-No shortness of breath, no cough, no dyspnea or exertion GI-No changes in bowel habits, no nausea or vomiting -No pain with urination Musculoskeletal-No recent joint pain or stiffness besides HPI Integ-No rashes or skin ulcerations Neuro-No history of seizures, no syncope Psych-No history of anxiety or depression Endocrine-No history of thyroid trouble Hem-No history of anemia PHYSICAL EXAMINATION: 
BMI:  ????? General Exam: 
General: No acute distress. Oriented to person, place, time, and situation Cardiovascular:  Regular rhythm by palpation of distal pulse Pulmonary:  Breathing comfortably. Psychiatric:  Well-oriented, normal mood and affect. No lymphadenopathy in all 4 extremities Alignment-near normal alignment of ????? extremity and equal to opposite extremity Range of motion-Pain with range of motion of the right ankle Vascular-distal pulses palpable in Right lower extremity Sensory/motor-deep tendon reflexes normal Right lower extremity. Motor and sensory function intact Stability- ????? It is difficult to assess stability because of the presence of a fracture. Tenderness to palpation Over the lateral aspect of the ankle Skin-no rashes or ulcerations She does have quite a bit of swelling over the right ankle Gait- ????? 
 
X-RAY: I have reviewed x-rays which show a displaced right lateral release fracture with posterior malleolar component ASSESSMENT: Right closed displaced lateral and posterior malleolar fracture 
????? Plan: With the degree of pain she is having and with the inability to really care for herself at home and be able to stay off of her right lower extremity I think we will admit her to the hospital today. I think this will allow us to get some basic labs as well as an EKG and have the anesthesia team see her preoperatively for consultation. The plan will be to treat her right ankle operatively with open reduction internal fixation of the lateral malleolus fracture as well as a posterior malleolus fracture hopefully tomorrow Electronically Signed By Renea Painter M.D.

## 2021-02-11 NOTE — H&P
Date of Service: 2021-02-11    PCP: ?????     CC: Right ankle pain    HPI:  Caitlin Stauffer is a 80years old female who presents today with Pain swelling and deformity of the right ankle. She basically had a fall yesterday from a standing height. She was seen in the emergency room and found to have a right displaced lateral malleolus fracture with a posterior malleolar component as well. This was treated with reduction and splinting and she is here today for follow-up. She is here with her daughter and they are just very concerned about her ability to care for herself at home. She is really been unable to stay off of the ankle and she has been in quite a bit of pain at home. She denies any problems besides her right ankle. PMH: No history of inflammatory arthritis;             PSH:  see attached hard copy for past surgical history. Family HX:  No history of inflammatory arthritis; Medications:Pravastatin 40 mg 1 p.o. daily benazepril 40 mg 1 p.o. daily amlodipine 5 mg 1 p.o. daily, calcium supplement, fish oil supplement, vitamin B6 supplement    Allergies: Morphine causes nausea and vomiting    Review of Systems:  General-No fever/chills, no changes in weight, no fatigue, no appetite changes. ?????  Eyes-No vision changes  ENT-No hearing changes, no difficulty swallowing  CV-No chest pain, no palpitations  RESP-No shortness of breath, no cough, no dyspnea or exertion  GI-No changes in bowel habits, no nausea or vomiting  -No pain with urination  Musculoskeletal-No recent joint pain or stiffness besides HPI  Integ-No rashes or skin ulcerations  Neuro-No history of seizures, no syncope  Psych-No history of anxiety or depression  Endocrine-No history of thyroid trouble  Hem-No history of anemia    PHYSICAL EXAMINATION:  BMI:  ????? General Exam:  General: No acute distress.   Oriented to person, place, time, and situation  Cardiovascular:  Regular rhythm by palpation of distal pulse  Pulmonary:  Breathing comfortably. Psychiatric:  Well-oriented, normal mood and affect. No lymphadenopathy in all 4 extremities  Alignment-near normal alignment of ????? extremity and equal to opposite extremity  Range of motion-Pain with range of motion of the right ankle  Vascular-distal pulses palpable in Right lower extremity  Sensory/motor-deep tendon reflexes normal Right lower extremity. Motor and sensory function intact  Stability- ????? It is difficult to assess stability because of the presence of a fracture. Tenderness to palpation Over the lateral aspect of the ankle  Skin-no rashes or ulcerations She does have quite a bit of swelling over the right ankle  Gait- ?????    X-RAY: I have reviewed x-rays which show a displaced right lateral release fracture with posterior malleolar component    ASSESSMENT: Right closed displaced lateral and posterior malleolar fracture  ????? Plan: With the degree of pain she is having and with the inability to really care for herself at home and be able to stay off of her right lower extremity I think we will admit her to the hospital today. I think this will allow us to get some basic labs as well as an EKG and have the anesthesia team see her preoperatively for consultation. The plan will be to treat her right ankle operatively with open reduction internal fixation of the lateral malleolus fracture as well as a posterior malleolus fracture hopefully tomorrow      Electronically Signed By Hellen Dubon M.D.

## 2021-02-12 ENCOUNTER — APPOINTMENT (OUTPATIENT)
Dept: GENERAL RADIOLOGY | Age: 86
DRG: 494 | End: 2021-02-12
Attending: ORTHOPAEDIC SURGERY
Payer: MEDICARE

## 2021-02-12 ENCOUNTER — HOSPITAL ENCOUNTER (INPATIENT)
Age: 86
LOS: 3 days | Discharge: NURSING, REHAB OR LONG-TERM CARE | DRG: 494 | End: 2021-02-15
Attending: ORTHOPAEDIC SURGERY | Admitting: ORTHOPAEDIC SURGERY
Payer: MEDICARE

## 2021-02-12 ENCOUNTER — ANESTHESIA (OUTPATIENT)
Dept: SURGERY | Age: 86
DRG: 494 | End: 2021-02-12
Payer: MEDICARE

## 2021-02-12 DIAGNOSIS — S82.891A CLOSED RIGHT ANKLE FRACTURE, INITIAL ENCOUNTER: Primary | ICD-10-CM

## 2021-02-12 PROBLEM — E78.5 HYPERLIPIDEMIA: Status: ACTIVE | Noted: 2021-02-12

## 2021-02-12 PROBLEM — I10 HYPERTENSION: Status: ACTIVE | Noted: 2021-02-12

## 2021-02-12 PROBLEM — C50.919 BREAST CANCER (HCC): Status: ACTIVE | Noted: 2021-02-12

## 2021-02-12 PROBLEM — C18.9 COLON CANCER (HCC): Status: ACTIVE | Noted: 2021-02-12

## 2021-02-12 PROBLEM — R29.6 FREQUENT FALLS: Status: ACTIVE | Noted: 2021-02-12

## 2021-02-12 LAB
ABO + RH BLD: NORMAL
ANION GAP SERPL CALC-SCNC: 8 MMOL/L (ref 7–16)
APPEARANCE UR: ABNORMAL
BACTERIA URNS QL MICRO: ABNORMAL /HPF
BASOPHILS # BLD: 0 K/UL (ref 0–0.2)
BASOPHILS NFR BLD: 0 % (ref 0–2)
BILIRUB UR QL: NEGATIVE
BLOOD GROUP ANTIBODIES SERPL: NORMAL
BUN SERPL-MCNC: 20 MG/DL (ref 8–23)
CALCIUM SERPL-MCNC: 9 MG/DL (ref 8.3–10.4)
CASTS URNS QL MICRO: ABNORMAL /LPF
CHLORIDE SERPL-SCNC: 107 MMOL/L (ref 98–107)
CO2 SERPL-SCNC: 24 MMOL/L (ref 21–32)
COLOR UR: YELLOW
CREAT SERPL-MCNC: 1 MG/DL (ref 0.6–1)
DIFFERENTIAL METHOD BLD: ABNORMAL
EOSINOPHIL # BLD: 0.1 K/UL (ref 0–0.8)
EOSINOPHIL NFR BLD: 1 % (ref 0.5–7.8)
EPI CELLS #/AREA URNS HPF: ABNORMAL /HPF
ERYTHROCYTE [DISTWIDTH] IN BLOOD BY AUTOMATED COUNT: 13.6 % (ref 11.9–14.6)
GLUCOSE SERPL-MCNC: 107 MG/DL (ref 65–100)
GLUCOSE UR STRIP.AUTO-MCNC: NEGATIVE MG/DL
HCT VFR BLD AUTO: 28.7 % (ref 35.8–46.3)
HGB BLD-MCNC: 9.1 G/DL (ref 11.7–15.4)
HGB UR QL STRIP: NEGATIVE
IMM GRANULOCYTES # BLD AUTO: 0.1 K/UL (ref 0–0.5)
IMM GRANULOCYTES NFR BLD AUTO: 1 % (ref 0–5)
INR PPP: 1.1
KETONES UR QL STRIP.AUTO: NEGATIVE MG/DL
LEUKOCYTE ESTERASE UR QL STRIP.AUTO: ABNORMAL
LYMPHOCYTES # BLD: 1.2 K/UL (ref 0.5–4.6)
LYMPHOCYTES NFR BLD: 19 % (ref 13–44)
MCH RBC QN AUTO: 32.6 PG (ref 26.1–32.9)
MCHC RBC AUTO-ENTMCNC: 31.7 G/DL (ref 31.4–35)
MCV RBC AUTO: 102.9 FL (ref 79.6–97.8)
MONOCYTES # BLD: 1.1 K/UL (ref 0.1–1.3)
MONOCYTES NFR BLD: 18 % (ref 4–12)
NEUTS SEG # BLD: 3.7 K/UL (ref 1.7–8.2)
NEUTS SEG NFR BLD: 61 % (ref 43–78)
NITRITE UR QL STRIP.AUTO: NEGATIVE
NRBC # BLD: 0 K/UL (ref 0–0.2)
PH UR STRIP: 5.5 [PH] (ref 5–9)
PLATELET # BLD AUTO: 214 K/UL (ref 150–450)
PMV BLD AUTO: 10.2 FL (ref 9.4–12.3)
POTASSIUM SERPL-SCNC: 4.1 MMOL/L (ref 3.5–5.1)
PROT UR STRIP-MCNC: NEGATIVE MG/DL
PROTHROMBIN TIME: 14.7 SEC (ref 12.5–14.7)
RBC # BLD AUTO: 2.79 M/UL (ref 4.05–5.2)
RBC #/AREA URNS HPF: ABNORMAL /HPF
SODIUM SERPL-SCNC: 139 MMOL/L (ref 136–145)
SP GR UR REFRACTOMETRY: 1.01 (ref 1–1.02)
SPECIMEN EXP DATE BLD: NORMAL
UROBILINOGEN UR QL STRIP.AUTO: 0.2 EU/DL (ref 0.2–1)
WBC # BLD AUTO: 6.1 K/UL (ref 4.3–11.1)
WBC URNS QL MICRO: ABNORMAL /HPF

## 2021-02-12 PROCEDURE — 73600 X-RAY EXAM OF ANKLE: CPT

## 2021-02-12 PROCEDURE — 80048 BASIC METABOLIC PNL TOTAL CA: CPT

## 2021-02-12 PROCEDURE — 77030003602 HC NDL NRV BLK BBMI -B: Performed by: ANESTHESIOLOGY

## 2021-02-12 PROCEDURE — 36415 COLL VENOUS BLD VENIPUNCTURE: CPT

## 2021-02-12 PROCEDURE — 74011250636 HC RX REV CODE- 250/636: Performed by: PHYSICIAN ASSISTANT

## 2021-02-12 PROCEDURE — 76942 ECHO GUIDE FOR BIOPSY: CPT | Performed by: ORTHOPAEDIC SURGERY

## 2021-02-12 PROCEDURE — 65270000029 HC RM PRIVATE

## 2021-02-12 PROCEDURE — 81001 URINALYSIS AUTO W/SCOPE: CPT

## 2021-02-12 PROCEDURE — 0QSJ04Z REPOSITION RIGHT FIBULA WITH INTERNAL FIXATION DEVICE, OPEN APPROACH: ICD-10-PCS | Performed by: ORTHOPAEDIC SURGERY

## 2021-02-12 PROCEDURE — 85025 COMPLETE CBC W/AUTO DIFF WBC: CPT

## 2021-02-12 PROCEDURE — 85610 PROTHROMBIN TIME: CPT

## 2021-02-12 PROCEDURE — 77030008462 HC STPLR SKN PROX J&J -A: Performed by: ORTHOPAEDIC SURGERY

## 2021-02-12 PROCEDURE — 74011000302 HC RX REV CODE- 302: Performed by: PHYSICIAN ASSISTANT

## 2021-02-12 PROCEDURE — 77030016474 HC BIT DRL QC3 SYNT -C: Performed by: ORTHOPAEDIC SURGERY

## 2021-02-12 PROCEDURE — 86901 BLOOD TYPING SEROLOGIC RH(D): CPT

## 2021-02-12 PROCEDURE — 0QSG04Z REPOSITION RIGHT TIBIA WITH INTERNAL FIXATION DEVICE, OPEN APPROACH: ICD-10-PCS | Performed by: ORTHOPAEDIC SURGERY

## 2021-02-12 PROCEDURE — 74011250637 HC RX REV CODE- 250/637: Performed by: ORTHOPAEDIC SURGERY

## 2021-02-12 PROCEDURE — 2709999900 HC NON-CHARGEABLE SUPPLY: Performed by: ORTHOPAEDIC SURGERY

## 2021-02-12 PROCEDURE — 74011250636 HC RX REV CODE- 250/636: Performed by: REGISTERED NURSE

## 2021-02-12 PROCEDURE — 0SSF04Z REPOSITION RIGHT ANKLE JOINT WITH INTERNAL FIXATION DEVICE, OPEN APPROACH: ICD-10-PCS | Performed by: ORTHOPAEDIC SURGERY

## 2021-02-12 PROCEDURE — 74011000250 HC RX REV CODE- 250: Performed by: ANESTHESIOLOGY

## 2021-02-12 PROCEDURE — 76210000006 HC OR PH I REC 0.5 TO 1 HR: Performed by: ORTHOPAEDIC SURGERY

## 2021-02-12 PROCEDURE — 76010000161 HC OR TIME 1 TO 1.5 HR INTENSV-TIER 1: Performed by: ORTHOPAEDIC SURGERY

## 2021-02-12 PROCEDURE — 76010010054 HC POST OP PAIN BLOCK: Performed by: ORTHOPAEDIC SURGERY

## 2021-02-12 PROCEDURE — C1713 ANCHOR/SCREW BN/BN,TIS/BN: HCPCS | Performed by: ORTHOPAEDIC SURGERY

## 2021-02-12 PROCEDURE — 77030017016 HC DSG ANTIMIC BARR2 S&N -B: Performed by: ORTHOPAEDIC SURGERY

## 2021-02-12 PROCEDURE — 74011250636 HC RX REV CODE- 250/636: Performed by: ANESTHESIOLOGY

## 2021-02-12 PROCEDURE — 74011250637 HC RX REV CODE- 250/637: Performed by: INTERNAL MEDICINE

## 2021-02-12 PROCEDURE — 77030003862 HC BIT DRL SYNT -B: Performed by: ORTHOPAEDIC SURGERY

## 2021-02-12 PROCEDURE — 2709999900 HC NON-CHARGEABLE SUPPLY

## 2021-02-12 PROCEDURE — 86580 TB INTRADERMAL TEST: CPT | Performed by: PHYSICIAN ASSISTANT

## 2021-02-12 PROCEDURE — 74011000250 HC RX REV CODE- 250: Performed by: REGISTERED NURSE

## 2021-02-12 PROCEDURE — 76060000033 HC ANESTHESIA 1 TO 1.5 HR: Performed by: ORTHOPAEDIC SURGERY

## 2021-02-12 PROCEDURE — 74011250637 HC RX REV CODE- 250/637: Performed by: PHYSICIAN ASSISTANT

## 2021-02-12 PROCEDURE — 77030015408 HC TAP BN SCR SYNT -C: Performed by: ORTHOPAEDIC SURGERY

## 2021-02-12 DEVICE — WASHER ORTH DIA7MM FOR CANN SCR: Type: IMPLANTABLE DEVICE | Site: ANKLE | Status: FUNCTIONAL

## 2021-02-12 DEVICE — SCREW BNE L34MM DIA4MM S STL CANN LNG HALF THRD SM HEX SOCK: Type: IMPLANTABLE DEVICE | Site: ANKLE | Status: FUNCTIONAL

## 2021-02-12 DEVICE — SCREW BNE L40MM DIA4MM S STL CANN SHT 1/3 THRD SM HEX SOCK: Type: IMPLANTABLE DEVICE | Site: ANKLE | Status: FUNCTIONAL

## 2021-02-12 DEVICE — SCREW BNE L44MM DIA4MM S STL CANN SHT 1/3 THRD SM HEX SOCK: Type: IMPLANTABLE DEVICE | Site: ANKLE | Status: FUNCTIONAL

## 2021-02-12 RX ORDER — PRAVASTATIN SODIUM 40 MG/1
40 TABLET ORAL
COMMUNITY

## 2021-02-12 RX ORDER — HYDROMORPHONE HYDROCHLORIDE 1 MG/ML
0.5 INJECTION, SOLUTION INTRAMUSCULAR; INTRAVENOUS; SUBCUTANEOUS
Status: DISCONTINUED | OUTPATIENT
Start: 2021-02-12 | End: 2021-02-15 | Stop reason: HOSPADM

## 2021-02-12 RX ORDER — SODIUM CHLORIDE 0.9 % (FLUSH) 0.9 %
5-40 SYRINGE (ML) INJECTION EVERY 8 HOURS
Status: DISCONTINUED | OUTPATIENT
Start: 2021-02-12 | End: 2021-02-15 | Stop reason: HOSPADM

## 2021-02-12 RX ORDER — PROPOFOL 10 MG/ML
INJECTION, EMULSION INTRAVENOUS AS NEEDED
Status: DISCONTINUED | OUTPATIENT
Start: 2021-02-12 | End: 2021-02-12 | Stop reason: HOSPADM

## 2021-02-12 RX ORDER — ONDANSETRON 2 MG/ML
4 INJECTION INTRAMUSCULAR; INTRAVENOUS ONCE
Status: DISCONTINUED | OUTPATIENT
Start: 2021-02-12 | End: 2021-02-12 | Stop reason: HOSPADM

## 2021-02-12 RX ORDER — MIDAZOLAM HYDROCHLORIDE 1 MG/ML
2 INJECTION, SOLUTION INTRAMUSCULAR; INTRAVENOUS
Status: DISCONTINUED | OUTPATIENT
Start: 2021-02-12 | End: 2021-02-12 | Stop reason: HOSPADM

## 2021-02-12 RX ORDER — DIPHENHYDRAMINE HYDROCHLORIDE 50 MG/ML
12.5 INJECTION, SOLUTION INTRAMUSCULAR; INTRAVENOUS
Status: DISCONTINUED | OUTPATIENT
Start: 2021-02-12 | End: 2021-02-12 | Stop reason: HOSPADM

## 2021-02-12 RX ORDER — NALOXONE HYDROCHLORIDE 0.4 MG/ML
.2-.4 INJECTION, SOLUTION INTRAMUSCULAR; INTRAVENOUS; SUBCUTANEOUS
Status: DISCONTINUED | OUTPATIENT
Start: 2021-02-12 | End: 2021-02-15 | Stop reason: HOSPADM

## 2021-02-12 RX ORDER — BENAZEPRIL HYDROCHLORIDE 40 MG/1
40 TABLET ORAL DAILY
COMMUNITY

## 2021-02-12 RX ORDER — PROPOFOL 10 MG/ML
INJECTION, EMULSION INTRAVENOUS
Status: DISCONTINUED | OUTPATIENT
Start: 2021-02-12 | End: 2021-02-12 | Stop reason: HOSPADM

## 2021-02-12 RX ORDER — FENTANYL CITRATE 50 UG/ML
100 INJECTION, SOLUTION INTRAMUSCULAR; INTRAVENOUS ONCE
Status: DISCONTINUED | OUTPATIENT
Start: 2021-02-12 | End: 2021-02-12 | Stop reason: HOSPADM

## 2021-02-12 RX ORDER — AMLODIPINE BESYLATE 5 MG/1
5 TABLET ORAL DAILY
COMMUNITY

## 2021-02-12 RX ORDER — HYDROMORPHONE HYDROCHLORIDE 2 MG/ML
0.5 INJECTION, SOLUTION INTRAMUSCULAR; INTRAVENOUS; SUBCUTANEOUS
Status: DISCONTINUED | OUTPATIENT
Start: 2021-02-12 | End: 2021-02-12 | Stop reason: HOSPADM

## 2021-02-12 RX ORDER — SODIUM CHLORIDE 0.9 % (FLUSH) 0.9 %
5-40 SYRINGE (ML) INJECTION AS NEEDED
Status: DISCONTINUED | OUTPATIENT
Start: 2021-02-12 | End: 2021-02-15 | Stop reason: HOSPADM

## 2021-02-12 RX ORDER — LORATADINE 10 MG/1
10 TABLET ORAL
COMMUNITY

## 2021-02-12 RX ORDER — ACETAMINOPHEN 325 MG/1
650 TABLET ORAL EVERY 8 HOURS
Status: DISCONTINUED | OUTPATIENT
Start: 2021-02-12 | End: 2021-02-15 | Stop reason: HOSPADM

## 2021-02-12 RX ORDER — AMOXICILLIN 250 MG
2 CAPSULE ORAL DAILY
Status: DISCONTINUED | OUTPATIENT
Start: 2021-02-13 | End: 2021-02-15 | Stop reason: HOSPADM

## 2021-02-12 RX ORDER — BUPIVACAINE HYDROCHLORIDE AND EPINEPHRINE 5; 5 MG/ML; UG/ML
INJECTION, SOLUTION EPIDURAL; INTRACAUDAL; PERINEURAL
Status: COMPLETED | OUTPATIENT
Start: 2021-02-12 | End: 2021-02-12

## 2021-02-12 RX ORDER — SODIUM CHLORIDE, SODIUM LACTATE, POTASSIUM CHLORIDE, CALCIUM CHLORIDE 600; 310; 30; 20 MG/100ML; MG/100ML; MG/100ML; MG/100ML
100 INJECTION, SOLUTION INTRAVENOUS CONTINUOUS
Status: DISCONTINUED | OUTPATIENT
Start: 2021-02-12 | End: 2021-02-12 | Stop reason: HOSPADM

## 2021-02-12 RX ORDER — OXYCODONE HYDROCHLORIDE 5 MG/1
5 TABLET ORAL
Status: DISCONTINUED | OUTPATIENT
Start: 2021-02-12 | End: 2021-02-15 | Stop reason: HOSPADM

## 2021-02-12 RX ORDER — ALBUTEROL SULFATE 0.83 MG/ML
2.5 SOLUTION RESPIRATORY (INHALATION) AS NEEDED
Status: DISCONTINUED | OUTPATIENT
Start: 2021-02-12 | End: 2021-02-12 | Stop reason: HOSPADM

## 2021-02-12 RX ORDER — MIDAZOLAM HYDROCHLORIDE 1 MG/ML
2 INJECTION, SOLUTION INTRAMUSCULAR; INTRAVENOUS ONCE
Status: DISCONTINUED | OUTPATIENT
Start: 2021-02-12 | End: 2021-02-12 | Stop reason: HOSPADM

## 2021-02-12 RX ORDER — OXYCODONE HYDROCHLORIDE 5 MG/1
5 TABLET ORAL
Status: DISCONTINUED | OUTPATIENT
Start: 2021-02-12 | End: 2021-02-12 | Stop reason: HOSPADM

## 2021-02-12 RX ORDER — NALOXONE HYDROCHLORIDE 0.4 MG/ML
0.1 INJECTION, SOLUTION INTRAMUSCULAR; INTRAVENOUS; SUBCUTANEOUS AS NEEDED
Status: DISCONTINUED | OUTPATIENT
Start: 2021-02-12 | End: 2021-02-12 | Stop reason: HOSPADM

## 2021-02-12 RX ORDER — OXYCODONE HYDROCHLORIDE 5 MG/1
10 TABLET ORAL
Status: DISCONTINUED | OUTPATIENT
Start: 2021-02-12 | End: 2021-02-12 | Stop reason: HOSPADM

## 2021-02-12 RX ORDER — DIPHENHYDRAMINE HCL 25 MG
25 CAPSULE ORAL
Status: DISCONTINUED | OUTPATIENT
Start: 2021-02-12 | End: 2021-02-15 | Stop reason: HOSPADM

## 2021-02-12 RX ORDER — PRAVASTATIN SODIUM 20 MG/1
40 TABLET ORAL
Status: DISCONTINUED | OUTPATIENT
Start: 2021-02-12 | End: 2021-02-15 | Stop reason: HOSPADM

## 2021-02-12 RX ORDER — LIDOCAINE HYDROCHLORIDE 10 MG/ML
0.1 INJECTION INFILTRATION; PERINEURAL AS NEEDED
Status: DISCONTINUED | OUTPATIENT
Start: 2021-02-12 | End: 2021-02-12 | Stop reason: HOSPADM

## 2021-02-12 RX ORDER — ONDANSETRON 2 MG/ML
4 INJECTION INTRAMUSCULAR; INTRAVENOUS
Status: DISCONTINUED | OUTPATIENT
Start: 2021-02-12 | End: 2021-02-15 | Stop reason: HOSPADM

## 2021-02-12 RX ORDER — CEFAZOLIN SODIUM/WATER 2 G/20 ML
2 SYRINGE (ML) INTRAVENOUS ONCE
Status: COMPLETED | OUTPATIENT
Start: 2021-02-13 | End: 2021-02-12

## 2021-02-12 RX ORDER — LIDOCAINE HYDROCHLORIDE 20 MG/ML
INJECTION, SOLUTION EPIDURAL; INFILTRATION; INTRACAUDAL; PERINEURAL AS NEEDED
Status: DISCONTINUED | OUTPATIENT
Start: 2021-02-12 | End: 2021-02-12 | Stop reason: HOSPADM

## 2021-02-12 RX ORDER — ONDANSETRON 2 MG/ML
4 INJECTION INTRAMUSCULAR; INTRAVENOUS
Status: DISCONTINUED | OUTPATIENT
Start: 2021-02-12 | End: 2021-02-12

## 2021-02-12 RX ADMIN — PROPOFOL 50 MG: 10 INJECTION, EMULSION INTRAVENOUS at 07:29

## 2021-02-12 RX ADMIN — LIDOCAINE HYDROCHLORIDE 50 MG: 20 INJECTION, SOLUTION EPIDURAL; INFILTRATION; INTRACAUDAL; PERINEURAL at 07:29

## 2021-02-12 RX ADMIN — ACETAMINOPHEN 650 MG: 325 TABLET ORAL at 21:18

## 2021-02-12 RX ADMIN — BUPIVACAINE HYDROCHLORIDE AND EPINEPHRINE BITARTRATE 20 ML: 5; .005 INJECTION, SOLUTION EPIDURAL; INTRACAUDAL; PERINEURAL at 06:52

## 2021-02-12 RX ADMIN — Medication 10 ML: at 13:43

## 2021-02-12 RX ADMIN — DIPHENHYDRAMINE HYDROCHLORIDE 25 MG: 25 CAPSULE ORAL at 21:18

## 2021-02-12 RX ADMIN — Medication 2 G: at 07:32

## 2021-02-12 RX ADMIN — MIDAZOLAM 2 MG: 1 INJECTION INTRAMUSCULAR; INTRAVENOUS at 06:51

## 2021-02-12 RX ADMIN — ACETAMINOPHEN 650 MG: 325 TABLET ORAL at 13:44

## 2021-02-12 RX ADMIN — BUPIVACAINE HYDROCHLORIDE AND EPINEPHRINE BITARTRATE 10 ML: 5; .005 INJECTION, SOLUTION EPIDURAL; INTRACAUDAL; PERINEURAL at 06:49

## 2021-02-12 RX ADMIN — Medication 10 ML: at 21:18

## 2021-02-12 RX ADMIN — PRAVASTATIN SODIUM 40 MG: 20 TABLET ORAL at 21:18

## 2021-02-12 RX ADMIN — MEPIVACAINE HYDROCHLORIDE 20 ML: 20 INJECTION, SOLUTION EPIDURAL; INFILTRATION at 06:49

## 2021-02-12 RX ADMIN — PROPOFOL 100 MCG/KG/MIN: 10 INJECTION, EMULSION INTRAVENOUS at 07:29

## 2021-02-12 RX ADMIN — TUBERCULIN PURIFIED PROTEIN DERIVATIVE 5 UNITS: 5 INJECTION, SOLUTION INTRADERMAL at 05:27

## 2021-02-12 RX ADMIN — SODIUM CHLORIDE, SODIUM LACTATE, POTASSIUM CHLORIDE, AND CALCIUM CHLORIDE: 600; 310; 30; 20 INJECTION, SOLUTION INTRAVENOUS at 07:19

## 2021-02-12 RX ADMIN — ACETAMINOPHEN 650 MG: 325 TABLET ORAL at 05:27

## 2021-02-12 NOTE — PROGRESS NOTES
TRANSFER - IN REPORT:    Verbal report received from [address], RN(name) on [employer]  being received from Rip van Wafels) for routine post - op      Report consisted of patients Situation, Background, Assessment and   Recommendations(SBAR). Information from the following report(s) SBAR, Kardex, Procedure Summary, Intake/Output and MAR was reviewed with the receiving nurse. Opportunity for questions and clarification was provided. Assessment completed upon patients arrival to unit and care assumed.

## 2021-02-12 NOTE — INTERVAL H&P NOTE
Update History & Physical 
 
The Patient's History and Physical of 2/11/2021 was reviewed with the patient and I examined the patient. There was no change. The surgical site was confirmed by the patient and me. Plan:  The risk, benefits, expected outcome, and alternative to the recommended procedure have been discussed with the patient. Patient understands and wants to proceed with open reduction internal fixation of right lateral and posterior malleolar ankle fractures.  
 
Electronically signed by Belinda Medrano MD on 2/12/2021 at 7:09 AM

## 2021-02-12 NOTE — ANESTHESIA POSTPROCEDURE EVALUATION
Procedure(s):  RIGHT ANKLE OPEN REDUCTION INTERNAL FIXATION / ADMIT 2/11. total IV anesthesia    Anesthesia Post Evaluation      Multimodal analgesia: multimodal analgesia used between 6 hours prior to anesthesia start to PACU discharge  Patient location during evaluation: PACU  Patient participation: complete - patient participated  Level of consciousness: awake and awake and alert  Pain management: adequate  Airway patency: patent  Anesthetic complications: no  Cardiovascular status: acceptable  Respiratory status: acceptable  Hydration status: acceptable  Post anesthesia nausea and vomiting:  controlled      INITIAL Post-op Vital signs:   Vitals Value Taken Time   /58 02/12/21 0853   Temp 36.5 °C (97.7 °F) 02/12/21 0828   Pulse 78 02/12/21 0857   Resp 16 02/12/21 0843   SpO2 97 % 02/12/21 0857   Vitals shown include unvalidated device data.

## 2021-02-12 NOTE — CONSULTS
Hospitalist Consult Note     Admit Date:  2021  2:04 AM   Name:  Caitlin Stauffer   Age:  80 y.o.  :  1932   MRN:  [id number]   PCP:  Andre Essex, MD  Treatment Team: Attending Provider: Dai Lucas MD; Consulting Provider: Purvi David MD; Hospitalist: Purvi David MD; Student Nurse: Lilly Sloan RN    Hospitalists consulted for medical management. HPI/Subjective:   Ms. Ev Bush is an 79 y/o female with a history of HTN, HLD, breast cancer (), colon cancer (6-7 years ago) who was seen in the ER on 2/10 with complaints of right knee and right ankle pain after a mechanical fall. Her floors were just cleaned/waxed and she got up and thinks she slipped on the floor and fell onto her right leg. Plain films at that time showed a dislocated right ankle fracture along with a non-displaced fracture of the proximal fibula. The ankle was reduced successfully on follow up X-ray. She was referred to Ortho. She was admitted on  for planned ORIF of her right bimalleolar fracture. Surgery was this morning and was uncomplicated. She is seen and examined at the bedside in her room. Son, Anneliese Weems, is at the bedside. She feels well. No headaches, syncope, dizziness, chest pain, palpitations, SOB/ALICEA, N/V/D, abdominal pain, peripheral edema. Does c/o RLE pain. Hospitalists consulted for medical management. 10 systems reviewed and negative except as noted in HPI.   Past Medical History:   Diagnosis Date    [facility] ([facility]     [facility] ([facility]     HLD (hyperlipidemia)     Hypertension       Past Surgical History:   Procedure Laterality Date    HX ANKLE FRACTURE TX Right 2021    HX COLECTOMY      HX MASTECTOMY Right       Allergies   Allergen Reactions    Morphine Nausea and Vomiting      Social History     Tobacco Use    Smoking status: Never Smoker    Smokeless tobacco: Never Used   Substance Use Topics    Alcohol use: Not Currently      Family History   Problem Relation Age of Onset    No Known Problems Mother     No Known Problems Father       Family history reviewed and noncontributory. Immunization History   Administered Date(s) Administered    TB Skin Test (PPD) Intradermal 02/12/2021       Objective:     Patient Vitals for the past 24 hrs:   Temp Pulse Resp BP SpO2   02/12/21 0915 97.8 °F (36.6 °C) 78 16 130/75 97 %   02/12/21 0858 97.8 °F (36.6 °C) 81 16 (!) 126/58 97 %   02/12/21 0853  98 16 (!) 127/58 98 %   02/12/21 0848  81 16 (!) 119/56 97 %   02/12/21 0843  81 16 (!) 116/56 98 %   02/12/21 0837  80 16 126/60 98 %   02/12/21 0832  77 16 (!) 113/53 98 %   02/12/21 0828 97.7 °F (36.5 °C) 85 16 (!) 114/55 98 %   02/12/21 0707  72 16 (!) 108/51 100 %   02/12/21 0702  78 16 (!) 98/48 100 %   02/12/21 0657  74 16 (!) 96/53 100 %   02/12/21 0652  74 16 (!) 107/55 100 %   02/12/21 0645  75 16 (!) 152/67 98 %   02/12/21 0600 98.6 °F (37 °C) 78 18 (!) 150/67 98 %   02/12/21 0353 97.9 °F (36.6 °C) 76 19 (!) 143/67 98 %   02/12/21 0235 98.1 °F (36.7 °C) 71 20 (!) 147/85 98 %     Oxygen Therapy  O2 Sat (%): 97 % (02/12/21 0915)  Pulse via Oximetry: 81 beats per minute (02/12/21 0858)  O2 Device: Nasal cannula (02/12/21 0858)  O2 Flow Rate (L/min): 3 l/min (02/12/21 0858)    Estimated body mass index is 28.19 kg/m² as calculated from the following:    Height as of this encounter: 5' 5.5\" (1.664 m). Weight as of this encounter: 78 kg (172 lb). Intake/Output Summary (Last 24 hours) at 2/12/2021 1153  Last data filed at 2/12/2021 0815  Gross per 24 hour   Intake 800 ml   Output 25 ml   Net 775 ml       *Note that automatically entered I/Os may not be accurate; dependent on patient compliance with collection and accurate  by assistants. Physical Exam:  General:    Well nourished. No overt distress. Obese, appears stated age. Eyes:   Normal sclerae. Extraocular movements intact. HENT:  Normocephalic, atraumatic.   Moist mucous membranes  CV:   RRR. No m/r/g. No edema  Lungs:  CTAB. No wheezing, rhonchi, or rales. Unlabored  Abdomen: Soft, nontender, nondistended. Extremities: Warm and dry. No cyanosis or clubbing. Mild edema to RUE (chronic). Neurologic: CN II-XII grossly intact. Sensation intact. Skin:     No rashes. No jaundice. Normal coloration. RLE in hard cast from knee to ankle. Psych:  Normal mood and affect. I reviewed the labs, imaging, EKGs, telemetry, and other studies done this admission. Data Review:   Recent Results (from the past 24 hour(s))   CBC WITH AUTOMATED DIFF    Collection Time: 02/12/21  4:58 AM   Result Value Ref Range    WBC 6.1 4.3 - 11.1 K/uL    RBC 2.79 (L) 4.05 - 5.2 M/uL    HGB 9.1 (L) 11.7 - 15.4 g/dL    HCT 28.7 (L) 35.8 - 46.3 %    .9 (H) 79.6 - 97.8 FL    MCH 32.6 26.1 - 32.9 PG    MCHC 31.7 31.4 - 35.0 g/dL    RDW 13.6 11.9 - 14.6 %    PLATELET 084 054 - 801 K/uL    MPV 10.2 9.4 - 12.3 FL    ABSOLUTE NRBC 0.00 0.0 - 0.2 K/uL    DF AUTOMATED      NEUTROPHILS 61 43 - 78 %    LYMPHOCYTES 19 13 - 44 %    MONOCYTES 18 (H) 4.0 - 12.0 %    EOSINOPHILS 1 0.5 - 7.8 %    BASOPHILS 0 0.0 - 2.0 %    IMMATURE GRANULOCYTES 1 0.0 - 5.0 %    ABS. NEUTROPHILS 3.7 1.7 - 8.2 K/UL    ABS. LYMPHOCYTES 1.2 0.5 - 4.6 K/UL    ABS. MONOCYTES 1.1 0.1 - 1.3 K/UL    ABS. EOSINOPHILS 0.1 0.0 - 0.8 K/UL    ABS. BASOPHILS 0.0 0.0 - 0.2 K/UL    ABS. IMM.  GRANS. 0.1 0.0 - 0.5 K/UL   METABOLIC PANEL, BASIC    Collection Time: 02/12/21  4:58 AM   Result Value Ref Range    Sodium 139 136 - 145 mmol/L    Potassium 4.1 3.5 - 5.1 mmol/L    Chloride 107 98 - 107 mmol/L    CO2 24 21 - 32 mmol/L    Anion gap 8 7 - 16 mmol/L    Glucose 107 (H) 65 - 100 mg/dL    BUN 20 8 - 23 MG/DL    Creatinine 1.00 0.6 - 1.0 MG/DL    GFR est AA >60 >60 ml/min/1.73m2    GFR est non-AA 56 (L) >60 ml/min/1.73m2    Calcium 9.0 8.3 - 10.4 MG/DL   PROTHROMBIN TIME + INR    Collection Time: 02/12/21  4:58 AM   Result Value Ref Range Prothrombin time 14.7 12.5 - 14.7 sec    INR 1.1     URINALYSIS W/ RFLX MICROSCOPIC    Collection Time: 02/12/21  5:39 AM   Result Value Ref Range    Color YELLOW      Appearance CLOUDY      Specific gravity 1.013 1.001 - 1.023      pH (UA) 5.5 5.0 - 9.0      Protein Negative NEG mg/dL    Glucose Negative mg/dL    Ketone Negative NEG mg/dL    Bilirubin Negative NEG      Blood Negative NEG      Urobilinogen 0.2 0.2 - 1.0 EU/dL    Nitrites Negative NEG      Leukocyte Esterase SMALL (A) NEG      WBC 20-50 0 /hpf    RBC 0-3 0 /hpf    Epithelial cells 0-3 0 /hpf    Bacteria TRACE 0 /hpf    Casts 0-3 0 /lpf   TYPE & SCREEN    Collection Time: 02/12/21  7:14 AM   Result Value Ref Range    Crossmatch Expiration 02/15/2021,2359     ABO/Rh(D) B POSITIVE     Antibody screen NEG        All Micro Results     None          Current Facility-Administered Medications   Medication Dose Route Frequency    acetaminophen (TYLENOL) tablet 650 mg  650 mg Oral Q8H    HYDROmorphone (PF) (DILAUDID) injection 0.5 mg  0.5 mg IntraVENous Q4H PRN    oxyCODONE IR (ROXICODONE) tablet 5 mg  5 mg Oral Q3H PRN    tuberculin injection 5 Units  5 Units IntraDERMal ONCE    sodium chloride (NS) flush 5-40 mL  5-40 mL IntraVENous Q8H    sodium chloride (NS) flush 5-40 mL  5-40 mL IntraVENous PRN    naloxone (NARCAN) injection 0.2-0.4 mg  0.2-0.4 mg IntraVENous Q10MIN PRN    ondansetron (ZOFRAN) injection 4 mg  4 mg IntraVENous Q4H PRN    diphenhydrAMINE (BENADRYL) capsule 25 mg  25 mg Oral Q4H PRN    [START ON 2/13/2021] senna-docusate (PERICOLACE) 8.6-50 mg per tablet 2 Tab  2 Tab Oral DAILY       Other Studies:  Xr Ankle Rt Ap/lat    Result Date: 2/12/2021  Examination: XR ANKLE RT AP/LAT INDICATION: post-op COMPARISON: 2/10/2021 and 2/12/2021 FINDINGS: Redemonstration of a trimalleolar fracture now status post ORIF with multiple screws. No evidence of hardware fracture or periprosthetic lucency. Overlying cast limits fine osseous detail. Diffuse soft tissue swelling. The ankle mortise is grossly within normal limits. No hardware complication following trimalleolar fracture ORIF. Xr Ankle Rt Ap/lat    Result Date: 2/12/2021  EXAMINATION: XR ANKLE RT AP/LAT HISTORY: OR 7.  TECHNIQUE: 3 fluoroscopic views of the right ankle were submitted. COMPARISON: X-ray dated 2/10/2021 FINDINGS: A pin is traversing the fracture of the distal fibula. Pins are seen fusing the distal fibula and tibia. Findings as described above. SARS-CoV-2 Lab Results  \"Novel Coronavirus\" Test: No results found for: COV2NT   \"Emergent Disease\" Test: No results found for: EDPR  \"SARS-COV-2\" Test: No results found for: XGCOVT  Rapid Test: No results found for: COVR         Assessment and Plan:     Hospital Problems as of 2/12/2021 Date Reviewed: 2/12/2021          Codes Class Noted - Resolved POA    * (Principal) Closed right ankle fracture, initial encounter ICD-10-CM: S82.891A  ICD-9-CM: 824.8  2/12/2021 - Present Unknown        Hypertension ICD-10-CM: I10  ICD-9-CM: 401.9  2/12/2021 - Present Unknown        Hyperlipidemia ICD-10-CM: E78.5  ICD-9-CM: 272.4  2/12/2021 - Present Unknown        Breast cancer ([facility] ICD-10-CM: C50.919  ICD-9-CM: 174.9  2/12/2021 - Present Unknown        Colon cancer ([facility] ICD-10-CM: C18.9  ICD-9-CM: 153.9  2/12/2021 - Present Unknown        Frequent falls ICD-10-CM: R29.6  ICD-9-CM: V15.88  2/12/2021 - Present Unknown              Plan:  # Right bimalleolar fracture/frequent falls   - S/p ORIF with Ortho on 2/12. General mgmt per primary. Therapies, likely needs rehab. # HTN   - BPs acceptable w/o home meds thus far. Monitor trend today, resume tomorrow if stable or increased. # HLD   - Resume pravastatin    # H/o breast and colon cancer    Other listed chronic conditions stable, continue current management. Thanks for the consult, will follow. Son, Yuly Hills, can be reached at [phone].     Signed:  Genaro Layton MD

## 2021-02-12 NOTE — ANESTHESIA PROCEDURE NOTES
Peripheral Block    Start time: 2/12/2021 6:45 AM  End time: 2/12/2021 6:49 AM  Performed by: Amrand Rey MD  Authorized by: Armand Rey MD       Pre-procedure:    Indications: at surgeon's request and post-op pain management    Preanesthetic Checklist: patient identified, risks and benefits discussed, site marked, timeout performed, anesthesia consent given and patient being monitored    Timeout Time: 06:45          Block Type:   Block Type:  Popliteal  Laterality:  Right  Monitoring:  Standard ASA monitoring, continuous pulse ox, frequent vital sign checks, heart rate, oxygen and responsive to questions  Injection Technique:  Single shot  Procedures: ultrasound guided    Prep: chlorhexidine    Needle Type:  Stimuplex  Needle Gauge:  21 G  Needle Localization:  Ultrasound guidance and anatomical landmarks  Medication Injected:  Bupivacaine-EPINEPHrine (PF)(SENSORCAINE) 0.5%-1:200,000 mg injection, 10 mL  mepivacaine (PF) 2 % (POLOCAINE) injection, 20 mL  Med Admin Time: 2/12/2021 6:49 AM    Assessment:  Number of attempts:  1  Injection Assessment:  Incremental injection every 5 mL, local visualized surrounding nerve on ultrasound, negative aspiration for blood, no paresthesia, no intravascular symptoms and ultrasound image on chart  Patient tolerance:  Patient tolerated the procedure well with no immediate complications

## 2021-02-12 NOTE — PERIOP NOTES
TRANSFER - OUT REPORT:    Verbal report given to Syeda Ureña RN on [employer]  being transferred to [id number] for routine post - op       Report consisted of patients Situation, Background, Assessment and   Recommendations(SBAR). Information from the following report(s) OR Summary, Procedure Summary, Intake/Output and MAR was reviewed with the receiving nurse. Lines:   Peripheral IV 02/12/21 Right Forearm (Active)   Site Assessment Clean, dry, & intact 02/12/21 0828   Phlebitis Assessment 0 02/12/21 0828   Infiltration Assessment 0 02/12/21 0828   Dressing Status Clean, dry, & intact 02/12/21 0828   Dressing Type Tape;Transparent 02/12/21 0828   Hub Color/Line Status Patent 02/12/21 0828        Opportunity for questions and clarification was provided. Patient transported with:   O2 @ 3 liters  Tech      Patient and family given floor number and nurses name. Family updated re: pt status after security code verified.

## 2021-02-12 NOTE — PROGRESS NOTES
02/12/21 0230   Dual Skin Pressure Injury Assessment   Dual Skin Pressure Injury Assessment X   Second Care Provider (Based on [address] [place] [address]) Cher Jones RN    Buttocks/Ishium  Left   Skin Integumentary   Skin Integumentary (WDL) X    Pressure  Injury Documentation No Pressure Injury Noted-Pressure Ulcer Prevention Initiated   Skin Color Ecchymosis (comment)  (scattered)   Skin Condition/Temp Warm;Dry;Flaky;Fragile   Skin Integrity Abrasion;Cracked;Scars (comment); Wound (add Wound LDA)  (L thigh; Left heel; Abd, R hip; left buttock)   Wound Prevention and Protection Methods   Orientation of Wound Prevention Posterior   Location of Wound Prevention Sacrum/Coccyx   Dressing Present  Yes  (applied)   Dressing Status Intact   Wound Offloading (Prevention Methods) Bed, pressure reduction mattress;Pillows;Repositioning; Foam silicone     Pt with scattered moles throughout upper body. Scattered bruising BUE & BLE. Scar to right hip, mid abdomen, and R breast. Abrasion to back of left thigh. Left heel dry and cracked. Mepilex applied. Right heel not visualized due to splint on RLE. Sacrum red, blanchable. Small, open area on left buttocks. Allevyn applied.

## 2021-02-12 NOTE — ANESTHESIA PROCEDURE NOTES
Peripheral Block    Start time: 2/12/2021 6:50 AM  End time: 2/12/2021 6:50 AM  Performed by: Audrey Arzola MD  Authorized by: Audrey Arzola MD       Pre-procedure: Indications: at surgeon's request and post-op pain management    Preanesthetic Checklist: patient identified, risks and benefits discussed, site marked, timeout performed, anesthesia consent given and patient being monitored    Timeout Time: 06:50          Block Type:   Block Type:   Adductor canal  Laterality:  Right  Monitoring:  Standard ASA monitoring, continuous pulse ox, frequent vital sign checks, heart rate, oxygen and responsive to questions  Injection Technique:  Single shot  Procedures: ultrasound guided    Patient Position: supine  Prep: chlorhexidine    Location:  Mid thigh  Needle Type:  Stimuplex  Needle Gauge:  21 G  Needle Localization:  Ultrasound guidance and anatomical landmarks  Medication Injected:  Bupivacaine-EPINEPHrine (PF)(SENSORCAINE) 0.5%-1:200,000 mg injection, 20 mL  Med Admin Time: 2/12/2021 6:52 AM    Assessment:  Number of attempts:  1  Injection Assessment:  Incremental injection every 5 mL, local visualized surrounding nerve on ultrasound, negative aspiration for blood, no paresthesia, no intravascular symptoms and ultrasound image on chart  Patient tolerance:  Patient tolerated the procedure well with no immediate complications

## 2021-02-12 NOTE — PROGRESS NOTES
Problem: Falls - Risk of  Goal: *Absence of Falls  Description: Document Kim Reynolds Fall Risk and appropriate interventions in the flowsheet. Outcome: Progressing Towards Goal  Note: Fall Risk Interventions:            Medication Interventions: Bed/chair exit alarm, Evaluate medications/consider consulting pharmacy, Patient to call before getting OOB, Teach patient to arise slowly    Elimination Interventions: Call light in reach, Bed/chair exit alarm, Patient to call for help with toileting needs, Stay With Me (per policy)    History of Falls Interventions: Bed/chair exit alarm, Door open when patient unattended, Investigate reason for fall         Problem: Patient Education: Go to Patient Education Activity  Goal: Patient/Family Education  Outcome: Progressing Towards Goal     Problem: Pressure Injury - Risk of  Goal: *Prevention of pressure injury  Description: Document Arnulfo Scale and appropriate interventions in the flowsheet.   Outcome: Progressing Towards Goal  Note: Pressure Injury Interventions:  Sensory Interventions: Assess changes in LOC, Assess need for specialty bed, Check visual cues for pain, Discuss PT/OT consult with provider, Keep linens dry and wrinkle-free, Pad between skin to skin, Pressure redistribution bed/mattress (bed type)    Moisture Interventions: Absorbent underpads, Apply protective barrier, creams and emollients, Check for incontinence Q2 hours and as needed, Limit adult briefs, Internal/External urinary devices    Activity Interventions: Increase time out of bed, Pressure redistribution bed/mattress(bed type), PT/OT evaluation    Mobility Interventions: HOB 30 degrees or less, Pressure redistribution bed/mattress (bed type), PT/OT evaluation    Nutrition Interventions: Document food/fluid/supplement intake    Friction and Shear Interventions: Apply protective barrier, creams and emollients, Foam dressings/transparent film/skin sealants                Problem: Patient Education: Go to Patient Education Activity  Goal: Patient/Family Education  Outcome: Progressing Towards Goal     Problem: Patient Education: Go to Patient Education Activity  Goal: Patient/Family Education  Outcome: Progressing Towards Goal     Problem: Lower Extremity Fracture:Day of Admission  Goal: Activity/Safety  Outcome: Progressing Towards Goal  Goal: Consults, if ordered  Outcome: Progressing Towards Goal  Goal: Diagnostic Test/Procedures  Outcome: Progressing Towards Goal  Goal: Nutrition/Diet  Outcome: Progressing Towards Goal  Goal: Medications  Outcome: Progressing Towards Goal  Goal: Respiratory  Outcome: Progressing Towards Goal  Goal: Treatments/Interventions/Procedures  Outcome: Progressing Towards Goal  Goal: Psychosocial  Outcome: Progressing Towards Goal  Goal: *Optimal pain control at patient's stated goal  Outcome: Progressing Towards Goal  Goal: *Hemodynamically stable  Outcome: Progressing Towards Goal  Goal: *Adequate oxygenation  Outcome: Progressing Towards Goal

## 2021-02-12 NOTE — ANESTHESIA PREPROCEDURE EVALUATION
Relevant Problems   No relevant active problems       Anesthetic History   No history of anesthetic complications            Review of Systems / Medical History  Patient summary reviewed, nursing notes reviewed and pertinent labs reviewed    Pulmonary  Within defined limits                 Neuro/Psych   Within defined limits           Cardiovascular                  Exercise tolerance: >4 METS     GI/Hepatic/Renal  Within defined limits              Endo/Other  Within defined limits           Other Findings              Physical Exam    Airway  Mallampati: II  TM Distance: 4 - 6 cm  Neck ROM: normal range of motion   Mouth opening: Normal     Cardiovascular  Regular rate and rhythm,  S1 and S2 normal,  no murmur, click, rub, or gallop             Dental  No notable dental hx       Pulmonary  Breath sounds clear to auscultation               Abdominal         Other Findings            Anesthetic Plan    ASA: 2  Anesthesia type: total IV anesthesia      Post-op pain plan if not by surgeon: peripheral nerve block single    Induction: Intravenous  Anesthetic plan and risks discussed with: Patient

## 2021-02-12 NOTE — PROGRESS NOTES
CM in to see patient for CM cosult related to potential need for rehab placement. Demographics, PCP and insurance verified. Patient states that she is normally independent in ADL and that her 21year old grandson lives with her and son lives close by. Family transports patient as needed. Therapy verbal recmmendations for SNF theviewed with patient who is agreeable. Call placed to son to confirm. List of medicare certified facilities in her area reviewed and patient would like referral sent to [facility]. Referral sent, covid ordered and liaison notified. Patient can discharge Monday to SNF. CM will continue to follow    Care Management Interventions  PCP Verified by CM: Yes([facility])  Mode of Transport at Discharge: BLS  Transition of Care Consult (CM Consult): Discharge Planning, SNF  Physical Therapy Consult: Yes  Occupational Therapy Consult: Yes  Speech Therapy Consult: No  Current Support Network: Own Home, Lives with Caregiver, Family Lives [place] year old grandson lives with patient.)  Confirm Follow Up Transport: Family  The Plan for Transition of Care is Related to the Following Treatment Goals : [facility] in order to return to baseline independence in ADLs.    The Patient and/or Patient Representative was Provided with a Choice of Provider and Agrees with the Discharge Plan?: Yes  Freedom of Choice List was Provided with Basic Dialogue that Supports the Patient's Individualized Plan of Care/Goals, Treatment Preferences and Shares the Quality Data Associated with the Providers?: Yes  Discharge Location  Discharge Placement: Skilled nursing facility

## 2021-02-12 NOTE — OP NOTES
Operative Report    Patient: Josesito Reddy MRN: [id number]  SSN: [id number]    YOB: 1932  Age: 80 y.o. Sex: female       Date of Surgery: 2/12/2021     History:  Josesito Reddy is a 80 y.o. female who fell and injured her right ankle. She was seen emergency room and found to have a laterally less fracture which was essentially a bimalleolar equivalent with significant lateral translation of the talus and she also had a posterior malleolar fragment that appeared to involve the posterior lateral portion of the distal tibia and the ligaments of the syndesmosis. We did talk to her about the need for open treatment of this. I tried explained that I thought this was very unstable and I felt that the best way to treat this would be with fixation versus trying to treat this with cast immobilization as she did have a significant mount of residual lateral talar shift and widening of her medial clear space after talking her about this she seemed to feel comfortable consenting. I talked to the patient and/or their representative and explained the exact nature the procedure. I also went through a detailed list of the material risks associated with  the procedure which included risk of bleeding, infection, injury to nearby structures, worsening the situation, as well as the risks associate with anesthesia and finally death. Also talked with him regarding the benefits and alternatives to the procedure.     Preoperative Diagnosis: Closed bimalleolar fracture of right ankle, initial encounter [S82.621A]     Postoperative Diagnosis: Closed bimalleolar fracture of right ankle, initial encounter [T49.367H]     Surgeon(s) and Role:     Luis Sanabria MD - Primary    Anesthesia: General     Procedure: Procedure(s): Open treatment of right bimalleolar ankle fracture with internal fixation    Procedure in Detail: After the successful induction of general anesthetic the right lower extremity was prepped and draped in usual sterile fashion. I should mention that since she did have some rather significant discoloration of her right lower extremity from about 8 cm below her knee down to her ankle. This was just some general ecchymosis and discoloration so I did use a Doppler to check her pulses and she did have intact dorsalis pedis as well as posterior tib pulses these appear to be decreased and monophasic but they were definitely present. Her toes were also warm. With this degree of soft tissue injury however I thought it was wise to go ahead and proceed with fixation through very small incisions. I then made a small incision distal to her lateral malleolus and placed first a 2.5 mm drill bit as well as a 3.5 tap into the distal fragment of the lateral malleolus fracture and this allow me to pull traction and hold the fibula out to length as well as I placed a inversion type stress while I then placed the first of 2 guidewires for a Synthes 4.0 cannulated screw in the lateral malleolus going into the distal tibia. After this was placed at I then remove the tap from the lateral malleolus and then replaced this with a 3.5 mm intramedullary screw down the shaft of the fibula and then I added a second syndesmosis screw as well just because there was still some concern of her some widening of the medial clear space. I did this as I placed again an inversion type stress over the ankle. After these were in place I then placed a additional screw capturing the posterior malleolar fragment and the ligaments of the distal tib-fib syndesmosis and again I held the foot inverted while I placed this and after this was in place it appeared that the ankle mortise very nicely reduced and was very stable.   I then removed all my guidewires closed incision with staples and placed the patient in a short leg cast.  She was then awakened and taken to cover him in stable condition      Estimated Blood Loss: 30 cc    Tourniquet Time: * No tourniquets in log *      Implants:   Implant Name Type Inv. Item Serial No.  Lot No. LRB No. Used Action   SCR BNE [facility] THRD 4X40MM SS --  - [id number]  SCR [facility] THRD 4X40MM SS --   [facility] Right 1 Implanted   SCR BNE [facility] THRD 4X44MM SS --  - [id number]  SCR [facility] THRD 4X44MM SS Joshuaise Pearl Right 1 Implanted   WASHER MINI SCR 7.0MM SS --  - [id number]  WASHER MINI SCR 7.0MM SS --   [employer] [place] [id number] Right 1 Implanted   SCR BNE [employer] THRD 4X34MM -- SS - [id number]  SCR BNE [employer] THRD 4X34MM -- SS  [employer] [place] [id number] Right 1 Implanted               Specimens: * No specimens in log *        Drains: None                Complications: None    Counts: Sponge and needle counts were correct times two.     Signed By:  Tammy Wheat MD     February 12, 2021

## 2021-02-12 NOTE — PERIOP NOTES
TRANSFER - IN REPORT:    Verbal report received from [place] on Josesito Haskinsser  being received from 7th floor, Room 722 for routine progression of care      Report consisted of patients Situation, Background, Assessment and   Recommendations(SBAR). Information from the following report(s) SBAR was reviewed with the receiving nurse. Opportunity for questions and clarification was provided. Assessment to be completed upon patients arrival to unit and care to be assumed.

## 2021-02-12 NOTE — PROGRESS NOTES
TRANSFER - OUT REPORT:    Verbal report given to Shay Peña RN (name) on Li Meek  being transferred to Pre-Op (unit) for ordered procedure       Report consisted of patients Situation, Background, Assessment and   Recommendations(SBAR). Information from the following report(s) SBAR, Kardex, Intake/Output, MAR and Recent Results was reviewed with the receiving nurse. Lines:       Opportunity for questions and clarification was provided.       Patient transported with:   Registered Nurse

## 2021-02-13 LAB
COVID-19 RAPID TEST, COVR: NOT DETECTED
MM INDURATION POC: 0 MM (ref 0–5)
PPD POC: NEGATIVE NEGATIVE
SARS-COV-2, COV2: NORMAL
SOURCE, COVRS: NORMAL

## 2021-02-13 PROCEDURE — 97530 THERAPEUTIC ACTIVITIES: CPT

## 2021-02-13 PROCEDURE — 77030038269 HC DRN EXT URIN PURWCK BARD -A

## 2021-02-13 PROCEDURE — 87635 SARS-COV-2 COVID-19 AMP PRB: CPT

## 2021-02-13 PROCEDURE — 74011250637 HC RX REV CODE- 250/637: Performed by: PHYSICIAN ASSISTANT

## 2021-02-13 PROCEDURE — 74011250637 HC RX REV CODE- 250/637: Performed by: INTERNAL MEDICINE

## 2021-02-13 PROCEDURE — 77030040393 HC DRSG OPTIFOAM GENT MDII -B

## 2021-02-13 PROCEDURE — 97162 PT EVAL MOD COMPLEX 30 MIN: CPT

## 2021-02-13 PROCEDURE — 74011250637 HC RX REV CODE- 250/637: Performed by: STUDENT IN AN ORGANIZED HEALTH CARE EDUCATION/TRAINING PROGRAM

## 2021-02-13 PROCEDURE — 74011250637 HC RX REV CODE- 250/637: Performed by: ORTHOPAEDIC SURGERY

## 2021-02-13 PROCEDURE — 97110 THERAPEUTIC EXERCISES: CPT

## 2021-02-13 PROCEDURE — 65270000029 HC RM PRIVATE

## 2021-02-13 PROCEDURE — 2709999900 HC NON-CHARGEABLE SUPPLY

## 2021-02-13 RX ORDER — AMLODIPINE BESYLATE 5 MG/1
5 TABLET ORAL DAILY
Status: DISCONTINUED | OUTPATIENT
Start: 2021-02-13 | End: 2021-02-15 | Stop reason: HOSPADM

## 2021-02-13 RX ADMIN — SENNOSIDES AND DOCUSATE SODIUM 2 TABLET: 8.6; 5 TABLET ORAL at 08:48

## 2021-02-13 RX ADMIN — Medication 10 ML: at 21:06

## 2021-02-13 RX ADMIN — Medication 10 ML: at 14:37

## 2021-02-13 RX ADMIN — ACETAMINOPHEN 650 MG: 325 TABLET ORAL at 14:42

## 2021-02-13 RX ADMIN — ACETAMINOPHEN 650 MG: 325 TABLET ORAL at 21:05

## 2021-02-13 RX ADMIN — PRAVASTATIN SODIUM 40 MG: 20 TABLET ORAL at 21:05

## 2021-02-13 RX ADMIN — ACETAMINOPHEN 650 MG: 325 TABLET ORAL at 05:24

## 2021-02-13 RX ADMIN — Medication 5 ML: at 05:24

## 2021-02-13 RX ADMIN — AMLODIPINE BESYLATE 5 MG: 5 TABLET ORAL at 08:48

## 2021-02-13 RX ADMIN — DIPHENHYDRAMINE HYDROCHLORIDE 25 MG: 25 CAPSULE ORAL at 21:06

## 2021-02-13 NOTE — PROGRESS NOTES
ACUTE PHYSICAL THERAPY GOALS:  (Developed with and agreed upon by patient and/or caregiver.)  STG:  (1.)Ms. Ev Bush will move from supine to sit and sit to supine , scoot up and down and roll side to side with MINIMAL ASSIST within 3 treatment day(s). (2.)Ms. Ev Bush will transfer from bed to chair and chair to bed with MODERATE ASSIST using the least restrictive device within 3 treatment day(s). (3.)Ms. Ev Bush will ambulate with MODERATE ASSIST for 3 feet with the least restrictive device within 3 treatment day(s).      LTG:  (1.)Ms. Ev Bush will move from supine to sit and sit to supine , scoot up and down and roll side to side in bed with CONTACT GUARD ASSIST within 7 treatment day(s). (2.)Ms. Ev Bush will transfer from bed to chair and chair to bed with MINIMAL ASSIST using the least restrictive device within 7 treatment day(s). (3.)Ms. Ev Bush will ambulate with MINIMAL ASSIST for 10+ feet with the least restrictive device within 7 treatment day(s). PHYSICAL THERAPY: Daily Note and PM Treatment Day # 1    Caitlin Stauffer is a 80 y.o. female   PRIMARY DIAGNOSIS: Closed right ankle fracture, initial encounter  Closed right ankle fracture, initial encounter [S82.891A]  Procedure(s) (LRB):  RIGHT ANKLE OPEN REDUCTION INTERNAL FIXATION / ADMIT 2/11 (Right)  1 Day Post-Op    ASSESSMENT:     REHAB RECOMMENDATIONS: CURRENT LEVEL OF FUNCTION:  (Most Recently Demonstrated)   Recommendation to date pending progress:  Setting:   Short-term Rehab  Equipment:    To Be Determined Bed Mobility:   Minimal Assistance  Sit to Stand:   Minimal Assistance  Transfers:   Moderate Assistance x 2  Gait/Mobility:   Not tested     ASSESSMENT:  Ms. Ev Bush is in the chair, very pleasant and very slightly confused. She performed seated exercises below then practiced sit to stand into the walker several times with mostly CGA.   She was able to transfer to the bed with the walker by taking a few shuffled steps and help to transfer weight off L leg. Progress made. SUBJECTIVE:   Ms. Cherrie Paniagua states, \"I'm going to fall. \"    SOCIAL HISTORY/ LIVING ENVIRONMENT: lives at home with grandson (19yo) who goes to school and works. OBJECTIVE:     PAIN: VITAL SIGNS: LINES/DRAINS:   Pre Treatment: Pain Screen  Pain Scale 1: FLACC  Pain Intensity 1: 0  Post Treatment: 0     O2 Device: Room air     MOBILITY: I Mod I S SBA CGA Min Mod Max Total  NT x2 Comments:   Bed Mobility    Rolling [] [] [] [] [] [] [] [] [] [] []    Supine to Sit [] [] [] [] [] [] [] [] [] [] []    Scooting [] [] [] [x] [] [] [] [] [] [] []    Sit to Supine [] [] [] [] [x] [] [] [] [] [] []    Transfers    Sit to Stand [] [] [] [] [] [x] [] [] [] [] []    Bed to Chair [] [] [] [] [] [] [x] [] [] [] [x]    Stand to Sit [] [] [] [] [x] [] [] [] [] [] []    I=Independent, Mod I=Modified Independent, S=Supervision, SBA=Standby Assistance, CGA=Contact Guard Assistance,   Min=Minimal Assistance, Mod=Moderate Assistance, Max=Maximal Assistance, Total=Total Assistance, NT=Not Tested    GAIT: I Mod I S SBA CGA Min Mod Max Total  NT x2 Comments:   Level of Assistance [] [] [] [] [] [] [] [] [] [x] []    Distance NA    DME Rolling Walker    Gait Quality NA    Weightbearing  Status WBAT  For transfers only   I=Independent, Mod I=Modified Independent, S=Supervision, SBA=Standby Assistance, CGA=Contact Guard Assistance,   Min=Minimal Assistance, Mod=Moderate Assistance, Max=Maximal Assistance, Total=Total Assistance, NT=Not Tested    PLAN:   FREQUENCY/DURATION: PT Plan of Care: BID for duration of hospital stay or until stated goals are met, whichever comes first.  TREATMENT:     TREATMENT:   ($$ Therapeutic Activity: 8-22 mins  $$ Therapeutic Exercises: 8-22 mins    )  Therapeutic Activity (15 Minutes): Therapeutic activity included Sit to Supine, Scooting and Transfer Training to improve functional Mobility and Strength.      Therapeutic Exercise: (  10 minutes):  Exercises per grid below to improve mobility and strength. Required minimal visual and verbal cues to promote proper body mechanics. Progressed complexity of movement as indicated.      Date:  2/13/21 Date:   Date:     Activity/Exercise Parameters Parameters Parameters   Seated TKE 10x B     Seated marching 10x B     Seated hip abd 10x B                                 AFTER TREATMENT POSITION/PRECAUTIONS:  Bed, Needs within reach and RN notified    INTERDISCIPLINARY COLLABORATION:  RN/PCT and PT/PTA    TOTAL TREATMENT DURATION:  PT Patient Time In/Time Out  Time In: 1315  Time Out: 200 [facility]

## 2021-02-13 NOTE — PROGRESS NOTES
Hospitalist Note     Admit Date:  2021  2:04 AM   Name:  Chiqui Hernández   Age:  80 y.o.  :  1932   MRN:  [id number]   PCP:  David Dominguez MD  Treatment Team: Attending Provider: Andrés Licona MD; Care Manager: Noreen Adams, JUAN J; Hospitalist: Joseluis Yarbrough MD; Primary Nurse: Pam Jordan RN; Primary Nurse: Oanh Soliman RN; Physical Therapist: Kristan Hobson DPT; Utilization Review: Akiko Parker RN    HPI/Subjective:   Ms. Hiram Reyes is an 79 y/o female with a history of HTN, HLD, breast cancer (1970s), colon cancer (6-7 years ago) who was seen in the ER on 2/10 with complaints of right knee and right ankle pain after a mechanical fall. Her floors were just cleaned/waxed and she got up and thinks she slipped on the floor and fell onto her right leg. Plain films at that time showed a dislocated right ankle fracture along with a non-displaced fracture of the proximal fibula. The ankle was reduced successfully on follow up X-ray. She was referred to Ortho. She was admitted on  for planned ORIF of her right bimalleolar fracture. Surgery was this morning and was uncomplicated. She is seen and examined at the bedside in her room. Son, Heron Sahu, is at the bedside. She feels well. No headaches, syncope, dizziness, chest pain, palpitations, SOB/ALICEA, N/V/D, abdominal pain, peripheral edema. Does c/o RLE pain. Hospitalists consulted for medical management. : Pt seen and examined at bedside. Pt slightly confused, which family member at bedside states is normal for her. She denies any fevers, chills, chest pain or pressure, SOB, lightheadedness, dizziness, palpitations, near syncope or syncope.      No other complaints  Objective:     Patient Vitals for the past 24 hrs:   Temp Pulse Resp BP SpO2   21 0755 97.8 °F (36.6 °C) 86 20 (!) 151/64 98 %   21 0444 97.6 °F (36.4 °C) 78 16 139/64 92 %   21 0049 97.9 °F (36.6 °C) 85 16 (!) 147/63 96 %   21 97.2 °F (36.2 °C) 91 16 127/62 94 %   02/12/21 1552     97 %   02/12/21 1528 97.9 °F (36.6 °C) 79 17 (!) 154/68 95 %     Oxygen Therapy  O2 Sat (%): 98 % (02/13/21 0755)  Pulse via Oximetry: 83 beats per minute (02/12/21 1552)  O2 Device: Room air (02/12/21 1552)  O2 Flow Rate (L/min): 3 l/min (02/12/21 0858)    Estimated body mass index is 28.19 kg/m² as calculated from the following:    Height as of this encounter: 5' 5.5\" (1.664 m). Weight as of this encounter: 78 kg (172 lb). Intake/Output Summary (Last 24 hours) at 2/13/2021 1226  Last data filed at 2/13/2021 0755  Gross per 24 hour   Intake 240 ml   Output 350 ml   Net -110 ml       *Note that automatically entered I/Os may not be accurate; dependent on patient compliance with collection and accurate  by techs. General:    Well nourished. Alert. CV:   RRR. No murmur, rub, or gallop. Lungs:   CTAB. No wheezing, rhonchi, or rales. Abdomen:   Soft, nontender, nondistended. Extremities: Warm and dry. No cyanosis or edema. RLE in hard cast from knee to ankle. Skin:     No rashes or jaundice.    Neuro:  No gross focal deficits    Data Reviewed:  I have reviewed all labs, meds, and studies from the last 24 hours:  Recent Results (from the past 24 hour(s))   PLEASE READ & DOCUMENT PPD TEST IN 24 HRS    Collection Time: 02/13/21  4:33 AM   Result Value Ref Range    PPD Negative Negative    mm Induration 0 0 - 5 mm   SARS-COV-2    Collection Time: 02/13/21  5:39 AM   Result Value Ref Range    SARS-CoV-2 Please find results under separate order     COVID-19 RAPID TEST    Collection Time: 02/13/21  5:39 AM   Result Value Ref Range    Specimen source NASAL      COVID-19 rapid test Not detected NOTD          Current Meds:  Current Facility-Administered Medications   Medication Dose Route Frequency    amLODIPine (NORVASC) tablet 5 mg  5 mg Oral DAILY    acetaminophen (TYLENOL) tablet 650 mg  650 mg Oral Q8H    HYDROmorphone (PF) (DILAUDID) injection 0.5 mg  0.5 mg IntraVENous Q4H PRN    oxyCODONE IR (ROXICODONE) tablet 5 mg  5 mg Oral Q3H PRN    sodium chloride (NS) flush 5-40 mL  5-40 mL IntraVENous Q8H    sodium chloride (NS) flush 5-40 mL  5-40 mL IntraVENous PRN    naloxone (NARCAN) injection 0.2-0.4 mg  0.2-0.4 mg IntraVENous Q10MIN PRN    ondansetron (ZOFRAN) injection 4 mg  4 mg IntraVENous Q4H PRN    diphenhydrAMINE (BENADRYL) capsule 25 mg  25 mg Oral Q4H PRN    senna-docusate (PERICOLACE) 8.6-50 mg per tablet 2 Tab  2 Tab Oral DAILY    pravastatin (PRAVACHOL) tablet 40 mg  40 mg Oral QHS       Other Studies:  No results found for this visit on 02/12/21. No results found. All Micro Results     Procedure Component Value Units Date/Time    COVID-19 RAPID TEST [[id number]] Collected: 02/13/21 0539    Order Status: Completed Specimen: Nasopharyngeal Updated: 02/13/21 0603     Specimen source NASAL        COVID-19 rapid test Not detected        Comment:      The specimen is NEGATIVE for SARS-CoV-2, the novel coronavirus associated with COVID-19. A negative result does not rule out COVID-19. This test has been authorized by the FDA under an Emergency Use Authorization (EUA) for use by authorized laboratories.         Fact sheet for Healthcare Providers: ConventionUpdate.co.nz  Fact sheet for Patients: ConventionUpdate.co.nz       Methodology: Isothermal Nucleic Acid Amplification               SARS-CoV-2 Lab Results  \"Novel Coronavirus\" Test: No results found for: COV2NT   \"Emergent Disease\" Test: No results found for: EDPR  \"SARS-COV-2\" Test: No results found for: XGCOVT  Rapid Test:   Lab Results   Component Value Date/Time    COVR Not detected 02/13/2021 05:39 AM            Assessment and Plan:     Hospital Problems as of 2/13/2021 Date Reviewed: 2/12/2021          Codes Class Noted - Resolved POA    * (Principal) Closed right ankle fracture, initial encounter ICD-10-CM: E69.018F  ICD-9-CM: 824.8  2/12/2021 - Present Unknown        Hypertension ICD-10-CM: I10  ICD-9-CM: 401.9  2/12/2021 - Present Unknown        Hyperlipidemia ICD-10-CM: E78.5  ICD-9-CM: 272.4  2/12/2021 - Present Unknown        Breast cancer ([facility] ICD-10-CM: C50.919  ICD-9-CM: 174.9  2/12/2021 - Present Unknown        Colon cancer ([facility] ICD-10-CM: C18.9  ICD-9-CM: 153.9  2/12/2021 - Present Unknown        Frequent falls ICD-10-CM: R29.6  ICD-9-CM: V15.88  2/12/2021 - Present Unknown              Plan:  # Right bimalleolar fracture/frequent falls              - S/p ORIF with Ortho on 2/12. General mgmt per primary. Therapies, likely needs rehab.     # HTN   - Bp slightly increased overnight, titrate back on home meds with norvasc today     # HLD              - Resume pravastatin     # H/o breast and colon cancer     Other listed chronic conditions stable, continue current management.     Son, Marcy Hargrove, can be reached at [phone].     Diet:  DIET REGULAR  DVT ppx:  Per ortho    Signed:  Jhonny Nascimento MD

## 2021-02-13 NOTE — PROGRESS NOTES
ACUTE PHYSICAL THERAPY GOALS:  (Developed with and agreed upon by patient and/or caregiver.)  STG:  (1.)Ms. Sukumar Robledo will move from supine to sit and sit to supine , scoot up and down and roll side to side with MINIMAL ASSIST within 3 treatment day(s). (2.)Ms. Sukumar Robledo will transfer from bed to chair and chair to bed with MODERATE ASSIST using the least restrictive device within 3 treatment day(s). (3.)Ms. Sukumar Robledo will ambulate with MODERATE ASSIST for 3 feet with the least restrictive device within 3 treatment day(s). LTG:  (1.)Ms. Sukumar Robledo will move from supine to sit and sit to supine , scoot up and down and roll side to side in bed with CONTACT GUARD ASSIST within 7 treatment day(s). (2.)Ms. Sukumar Robledo will transfer from bed to chair and chair to bed with MINIMAL ASSIST using the least restrictive device within 7 treatment day(s). (3.)Ms. Sukumar Robledo will ambulate with MINIMAL ASSIST for 10+ feet with the least restrictive device within 7 treatment day(s).   ________________________________________________________________________________________________        PHYSICAL THERAPY ASSESSMENT: Initial Assessment and AM PT Treatment Day # 1    WBAT for trnshyanne Araujo is a 80 y.o. female   PRIMARY DIAGNOSIS: Closed right ankle fracture, initial encounter  Closed right ankle fracture, initial encounter [S82.891A]  Procedure(s) (LRB):  RIGHT ANKLE OPEN REDUCTION INTERNAL FIXATION / ADMIT 2/11 (Right)  1 Day Post-Op  Reason for Referral:    ICD-10: Treatment Diagnosis: Generalized Muscle Weakness (M62.81)  Difficulty in walking, Not elsewhere classified (R26.2)  [place] / [place] / Product Type: Medicare /     ASSESSMENT:     REHAB RECOMMENDATIONS:   Recommendation to date pending progress:  Setting:   Short-term Rehab  Equipment:    None     PRIOR LEVEL OF FUNCTION:  (Prior to Hospitalization) INITIAL/CURRENT LEVEL OF FUNCTION:  (Most Recently Demonstrated)   Bed Mobility:   Independent  Sit to Stand:   Modified Independent  Transfers:   Modified Independent  Gait/Mobility:   Modified Independent Bed Mobility:   Moderate Assistance  Sit to Stand:   Moderate Assistance  Transfers:   Maximal Assistance  Gait/Mobility:   Maximal Assistance     ASSESSMENT:  Ms. Meghann Thomas reports no pain. Main difficulty is poor prprio despite cues to make motion easier. She gets fatigued with getting to EOB, standing and attempting to walk. She attempted to move around to chair using RW but was unable to wt shift to slide feet then needed maxA for trnf. Main limitation is ability. Pt will benefit from skilled and sophisticated PT to help improve impairments. SUBJECTIVE:   Ms. Meghann Thomas states, \"well hello. \"    SOCIAL HISTORY/LIVING ENVIRONMENT: lives in 1 story house with grandson that is gone, however she uses shower and challenging tasks when someone is home.       OBJECTIVE:     PAIN: VITAL SIGNS: LINES/DRAINS:   Pre Treatment: Pain Screen  Pain Scale 1: Numeric (0 - 10)  Pain Intensity 1: 0  Post Treatment: 0   Purewick  O2 Device: Room air     GROSS EVALUATION:   Within Functional Limits Abnormal/ Functional Abnormal/ Non-Functional (see comments) Not Tested Comments:   AROM [] [x] [] []    PROM [] [] [] []    Strength [] [x] [] []    Balance [] [x] [] []    Posture [] [x] [] []    Sensation [] [x] [] []    Coordination [] [x] [] []    Tone [] [] [] []    Edema [] [x] [] []    Activity Tolerance [] [x] [] []     [] [] [] []      COGNITION/  PERCEPTION: Intact Impaired   (see comments) Comments:   Orientation [x] []    Vision [x] []    Hearing [x] []    Command Following [x] []    Safety Awareness [] [x]     [] []      MOBILITY: I Mod I S SBA CGA Min Mod Max Total  NT x2 Comments:   Bed Mobility    Rolling [] [] [] [] [] [] [x] [] [] [] []    Supine to Sit [] [] [] [] [] [] [x] [] [] [] []    Scooting [] [] [] [] [] [] [x] [] [] [] []    Sit to Supine [] [] [] [] [] [] [] [] [] [] []    Transfers    Sit to Stand [] [] [] [] [] [] [] [x] [] [] []    Bed to Chair [] [] [] [] [] [] [] [x] [] [] []    Stand to Sit [] [] [] [] [] [] [] [x] [] [] []    I=Independent, Mod I=Modified Independent, S=Supervision, SBA=Standby Assistance, CGA=Contact Guard Assistance,   Min=Minimal Assistance, Mod=Moderate Assistance, Max=Maximal Assistance, Total=Total Assistance, NT=Not Tested  GAIT: I Mod I S SBA CGA Min Mod Max Total  NT x2 Comments:   Level of Assistance [] [] [] [] [] [] [] [] [] [x] []    Distance      DME None    Gait Quality      Weightbearing Status WBAT     I=Independent, Mod I=Modified Independent, S=Supervision, SBA=Standby Assistance, CGA=Contact Guard Assistance,   Min=Minimal Assistance, Mod=Moderate Assistance, Max=Maximal Assistance, Total=Total Assistance, NT=Not Tested    [address] [place] [address] AM-[facility]       How much difficulty does the patient currently have. .. Unable A Lot A Little None   1. Turning over in bed (including adjusting bedclothes, sheets and blankets)? [] 1   [x] 2   [] 3   [] 4   2. Sitting down on and standing up from a chair with arms ( e.g., wheelchair, bedside commode, etc.)   [] 1   [x] 2   [] 3   [] 4   3. Moving from lying on back to sitting on the side of the bed? [] 1   [x] 2   [] 3   [] 4   How much help from another person does the patient currently need. .. Total A Lot A Little None   4. Moving to and from a bed to a chair (including a wheelchair)? [] 1   [x] 2   [] 3   [] 4   5. Need to walk in hospital room? [] 1   [x] 2   [] 3   [] 4   6. Climbing 3-5 steps with a railing? [x] 1   [] 2   [] 3   [] 4   © 2007, Trustees of [address] [place] [id number], under license to [employer]. All rights reserved     Score:  Initial: 11 Most Recent: X (Date: -- )    Interpretation of Tool:  Represents activities that are increasingly more difficult (i.e. Bed mobility, Transfers, Gait).     PLAN: FREQUENCY/DURATION: PT Plan of Care: BID for duration of hospital stay or until stated goals are met, whichever comes first.    PROBLEM LIST:   (Skilled intervention is medically necessary to address:)  1. Decreased ADL/Functional Activities  2. Decreased Activity Tolerance  3. Decreased AROM/PROM  4. Decreased Balance  5. Decreased Cognition  6. Decreased Coordination  7. Decreased Gait Ability  8. Decreased Strength  9. Decreased Transfer Abilities  10. Increased Pain   INTERVENTIONS PLANNED:   (Benefits and precautions of physical therapy have been discussed with the patient.)  1. Therapeutic Activity  2. Therapeutic Exercise/HEP  3. Neuromuscular Re-education  4. Gait Training  5. Education     TREATMENT:     EVALUATION: Moderate Complexity : (Untimed Charge)    TREATMENT:   ($$ Therapeutic Activity: 8-22 mins    )  Therapeutic Activity (10 Minutes): Therapeutic activity included Rolling, Supine to Sit, Sit to Supine, Scooting, Lateral Scooting, Transfer Training, Ambulation on level ground, Sitting balance  and Standing balance to improve functional Mobility, Strength, ROM and Activity tolerance.     AFTER TREATMENT POSITION/PRECAUTIONS:  Alarm Activated, Chair, Needs within reach and RN notified    INTERDISCIPLINARY COLLABORATION:  RN/PCT and PT/PTA    TOTAL TREATMENT DURATION:  PT Patient Time In/Time Out  Time In: 0920  Time Out: [address] [facility]

## 2021-02-13 NOTE — PROGRESS NOTES
2021         Post Op day: 1 Day Post-Op     Admit Date: 2021  Admit Diagnosis: Closed right ankle fracture, initial encounter [S82.891A]    Subjective: Doing well, No complaints, No SOB, No Chest Pain, No Nausea or Vomitting. Weight Bearing Status: WBAT  BMP:  Recent Labs     21  0458   CREA 1.00   BUN 20      K 4.1      CO2 24   AGAP 8   *     Patient Vitals for the past 8 hrs:   BP Temp Pulse Resp SpO2   21 0755 (!) 151/64 97.8 °F (36.6 °C) 86 20 98 %   21 0444 139/64 97.6 °F (36.4 °C) 78 16 92 %     Temp (24hrs), Av.7 °F (36.5 °C), Min:97.2 °F (36.2 °C), Max:97.9 °F (36.6 °C)    CBC:  Recent Labs     218   WBC 6.1   GRANS 61   MONOS 18*   EOS 1   ANEU 3.7   ABL 1.2   HGB 9.1*   HCT 28.7*          Microbiology:     All Micro Results     Procedure Component Value Units Date/Time    COVID-19 RAPID TEST [[id number]] Collected: 21 0539    Order Status: Completed Specimen: Nasopharyngeal Updated: 21 06     Specimen source NASAL        COVID-19 rapid test Not detected        Comment:      The specimen is NEGATIVE for SARS-CoV-2, the novel coronavirus associated with COVID-19. A negative result does not rule out COVID-19. This test has been authorized by the FDA under an Emergency Use Authorization (EUA) for use by authorized laboratories.         Fact sheet for Healthcare Providers: ConventionUpdate.co.nz  Fact sheet for Patients: ConventionUpdate.co.nz       Methodology: Isothermal Nucleic Acid Amplification             Objective: Vital Signs are Stable, No Acute Distress, Alert and Oriented  Dressing is Dry,  Neurovascular exam is normal.    Assessment:  Patient Active Problem List   Diagnosis Code    Closed right ankle fracture, initial encounter S82.891A    Hypertension I10    Hyperlipidemia E78.5    Breast cancer ([facility] C50.919    Colon cancer ([facility] [address] [facility] C18.9    Frequent falls R29.6       Plan: Continue Physical Therapy  Monitor Hbg and labs. Dispo soon    Signed By: Efra Palencia MD   2021         Post Op day: 1 Day Post-Op     Admit Date: 2021  Admit Diagnosis: Closed right ankle fracture, initial encounter [S82.690P]    Subjective: Doing well, No complaints, No SOB, No Chest Pain, No Nausea or Vomitting. Weight Bearing Status: WBAT  BMP:  Recent Labs     21  0458   CREA 1.00   BUN 20      K 4.1      CO2 24   AGAP 8   *     Patient Vitals for the past 8 hrs:   BP Temp Pulse Resp SpO2   21 0755 (!) 151/64 97.8 °F (36.6 °C) 86 20 98 %   21 0444 139/64 97.6 °F (36.4 °C) 78 16 92 %     Temp (24hrs), Av.7 °F (36.5 °C), Min:97.2 °F (36.2 °C), Max:97.9 °F (36.6 °C)    CBC:  Recent Labs     218   WBC 6.1   GRANS 61   MONOS 18*   EOS 1   ANEU 3.7   ABL 1.2   HGB 9.1*   HCT 28.7*          Microbiology:     All Micro Results     Procedure Component Value Units Date/Time    COVID-19 RAPID TEST [[id number]] Collected: 21 0539    Order Status: Completed Specimen: Nasopharyngeal Updated: 21 06     Specimen source NASAL        COVID-19 rapid test Not detected        Comment:      The specimen is NEGATIVE for SARS-CoV-2, the novel coronavirus associated with COVID-19. A negative result does not rule out COVID-19. This test has been authorized by the FDA under an Emergency Use Authorization (EUA) for use by authorized laboratories.         Fact sheet for Healthcare Providers: ConventionUpdate.co.nz  Fact sheet for Patients: ConventionUpdate.co.nz       Methodology: Isothermal Nucleic Acid Amplification             Objective: Vital Signs are Stable, No Acute Distress, Alert and Oriented  Dressing is Dry,  Neurovascular exam is normal.  Assessment:  Patient Active Problem List   Diagnosis Code    Closed right ankle fracture, initial encounter S82.694X    Hypertension I10    Hyperlipidemia E78.5    Breast cancer ([facility] [address] C50.919    Colon cancer ([facility] [address] C18.9    Frequent falls R29.6   less pain nv ov ok await rehab    Plan: Continue Physical Therapy  Monitor Hbg and labs. Dispo soon    Signed By: Isaiah Shelby MDHISTORY OF PRESENT ILLNESS  Zainab Bennett is a 80 y.o. female.   HPI    ROS    Physical Exam    ASSESSMENT and PLAN

## 2021-02-14 LAB
ANION GAP SERPL CALC-SCNC: 7 MMOL/L (ref 7–16)
BASOPHILS # BLD: 0 K/UL (ref 0–0.2)
BASOPHILS NFR BLD: 0 % (ref 0–2)
BUN SERPL-MCNC: 15 MG/DL (ref 8–23)
CALCIUM SERPL-MCNC: 8.6 MG/DL (ref 8.3–10.4)
CHLORIDE SERPL-SCNC: 106 MMOL/L (ref 98–107)
CO2 SERPL-SCNC: 23 MMOL/L (ref 21–32)
CREAT SERPL-MCNC: 0.98 MG/DL (ref 0.6–1)
DIFFERENTIAL METHOD BLD: ABNORMAL
EOSINOPHIL # BLD: 0.1 K/UL (ref 0–0.8)
EOSINOPHIL NFR BLD: 1 % (ref 0.5–7.8)
ERYTHROCYTE [DISTWIDTH] IN BLOOD BY AUTOMATED COUNT: 13.5 % (ref 11.9–14.6)
GLUCOSE SERPL-MCNC: 107 MG/DL (ref 65–100)
HCT VFR BLD AUTO: 26.6 % (ref 35.8–46.3)
HGB BLD-MCNC: 8.5 G/DL (ref 11.7–15.4)
IMM GRANULOCYTES # BLD AUTO: 0.1 K/UL (ref 0–0.5)
IMM GRANULOCYTES NFR BLD AUTO: 2 % (ref 0–5)
LYMPHOCYTES # BLD: 1.4 K/UL (ref 0.5–4.6)
LYMPHOCYTES NFR BLD: 19 % (ref 13–44)
MCH RBC QN AUTO: 32.3 PG (ref 26.1–32.9)
MCHC RBC AUTO-ENTMCNC: 32 G/DL (ref 31.4–35)
MCV RBC AUTO: 101.1 FL (ref 79.6–97.8)
MM INDURATION POC: 0 MM (ref 0–5)
MONOCYTES # BLD: 1.1 K/UL (ref 0.1–1.3)
MONOCYTES NFR BLD: 16 % (ref 4–12)
NEUTS SEG # BLD: 4.3 K/UL (ref 1.7–8.2)
NEUTS SEG NFR BLD: 61 % (ref 43–78)
NRBC # BLD: 0.02 K/UL (ref 0–0.2)
PLATELET # BLD AUTO: 219 K/UL (ref 150–450)
PMV BLD AUTO: 10.2 FL (ref 9.4–12.3)
POTASSIUM SERPL-SCNC: 4 MMOL/L (ref 3.5–5.1)
PPD POC: NEGATIVE NEGATIVE
RBC # BLD AUTO: 2.63 M/UL (ref 4.05–5.2)
SODIUM SERPL-SCNC: 136 MMOL/L (ref 136–145)
WBC # BLD AUTO: 7 K/UL (ref 4.3–11.1)

## 2021-02-14 PROCEDURE — 74011250637 HC RX REV CODE- 250/637: Performed by: STUDENT IN AN ORGANIZED HEALTH CARE EDUCATION/TRAINING PROGRAM

## 2021-02-14 PROCEDURE — 74011250637 HC RX REV CODE- 250/637: Performed by: INTERNAL MEDICINE

## 2021-02-14 PROCEDURE — 97110 THERAPEUTIC EXERCISES: CPT

## 2021-02-14 PROCEDURE — 36415 COLL VENOUS BLD VENIPUNCTURE: CPT

## 2021-02-14 PROCEDURE — 74011250637 HC RX REV CODE- 250/637: Performed by: ORTHOPAEDIC SURGERY

## 2021-02-14 PROCEDURE — 85025 COMPLETE CBC W/AUTO DIFF WBC: CPT

## 2021-02-14 PROCEDURE — 80048 BASIC METABOLIC PNL TOTAL CA: CPT

## 2021-02-14 PROCEDURE — 74011250637 HC RX REV CODE- 250/637: Performed by: PHYSICIAN ASSISTANT

## 2021-02-14 PROCEDURE — 65270000029 HC RM PRIVATE

## 2021-02-14 PROCEDURE — 97530 THERAPEUTIC ACTIVITIES: CPT

## 2021-02-14 RX ORDER — POLYETHYLENE GLYCOL 3350 17 G/17G
17 POWDER, FOR SOLUTION ORAL DAILY
Status: DISCONTINUED | OUTPATIENT
Start: 2021-02-15 | End: 2021-02-15 | Stop reason: HOSPADM

## 2021-02-14 RX ADMIN — OXYCODONE 5 MG: 5 TABLET ORAL at 17:22

## 2021-02-14 RX ADMIN — Medication 10 ML: at 05:08

## 2021-02-14 RX ADMIN — DIPHENHYDRAMINE HYDROCHLORIDE 25 MG: 25 CAPSULE ORAL at 21:14

## 2021-02-14 RX ADMIN — ACETAMINOPHEN 650 MG: 325 TABLET ORAL at 21:14

## 2021-02-14 RX ADMIN — ACETAMINOPHEN 650 MG: 325 TABLET ORAL at 05:08

## 2021-02-14 RX ADMIN — SENNOSIDES AND DOCUSATE SODIUM 2 TABLET: 8.6; 5 TABLET ORAL at 08:49

## 2021-02-14 RX ADMIN — Medication 10 ML: at 13:08

## 2021-02-14 RX ADMIN — PRAVASTATIN SODIUM 40 MG: 20 TABLET ORAL at 21:14

## 2021-02-14 RX ADMIN — ACETAMINOPHEN 650 MG: 325 TABLET ORAL at 13:08

## 2021-02-14 RX ADMIN — AMLODIPINE BESYLATE 5 MG: 5 TABLET ORAL at 08:49

## 2021-02-14 RX ADMIN — Medication 10 ML: at 21:14

## 2021-02-14 NOTE — PROGRESS NOTES
Problem: Falls - Risk of  Goal: *Absence of Falls  Description: Document [place] Fall Risk and appropriate interventions in the flowsheet. Outcome: Progressing Towards Goal  Note: Fall Risk Interventions:  Mobility Interventions: Bed/chair exit alarm, Communicate number of staff needed for ambulation/transfer, Patient to call before getting OOB         Medication Interventions: Bed/chair exit alarm, Patient to call before getting OOB, Teach patient to arise slowly    Elimination Interventions: Bed/chair exit alarm, Call light in reach, Patient to call for help with toileting needs, Stay With Me (per policy), Toilet paper/wipes in reach, Toileting schedule/hourly rounds    History of Falls Interventions: Bed/chair exit alarm, Consult care management for discharge planning, Investigate reason for fall, Door open when patient unattended, Room close to nurse's station         Problem: Patient Education: Go to Patient Education Activity  Goal: Patient/Family Education  Outcome: Progressing Towards Goal     Problem: Pressure Injury - Risk of  Goal: *Prevention of pressure injury  Description: Document Arnulfo Scale and appropriate interventions in the flowsheet.   Outcome: Progressing Towards Goal  Note: Pressure Injury Interventions:  Sensory Interventions: Assess changes in LOC, Avoid rigorous massage over bony prominences    Moisture Interventions: Absorbent underpads, Check for incontinence Q2 hours and as needed    Activity Interventions: Increase time out of bed, Pressure redistribution bed/mattress(bed type), PT/OT evaluation    Mobility Interventions: HOB 30 degrees or less, Pressure redistribution bed/mattress (bed type), PT/OT evaluation    Nutrition Interventions: Document food/fluid/supplement intake, Offer support with meals,snacks and hydration    Friction and Shear Interventions: HOB 30 degrees or less                Problem: Patient Education: Go to Patient Education Activity  Goal: Patient/Family Education  Outcome: Progressing Towards Goal     Problem: Patient Education: Go to Patient Education Activity  Goal: Patient/Family Education  Outcome: Progressing Towards Goal     Problem: Lower Extremity Fracture:Day of Admission  Goal: Off Pathway (Use only if patient is Off Pathway)  Outcome: Progressing Towards Goal  Goal: Activity/Safety  Outcome: Progressing Towards Goal  Goal: Consults, if ordered  Outcome: Progressing Towards Goal  Goal: Diagnostic Test/Procedures  Outcome: Progressing Towards Goal  Goal: Nutrition/Diet  Outcome: Progressing Towards Goal  Goal: Medications  Outcome: Progressing Towards Goal  Goal: Respiratory  Outcome: Progressing Towards Goal  Goal: Treatments/Interventions/Procedures  Outcome: Progressing Towards Goal  Goal: Psychosocial  Outcome: Progressing Towards Goal  Goal: *Optimal pain control at patient's stated goal  Outcome: Progressing Towards Goal  Goal: *Hemodynamically stable  Outcome: Progressing Towards Goal  Goal: *Adequate oxygenation  Outcome: Progressing Towards Goal     Problem: Lower Extremity Fracture:Day of Surgery ([facility] SCIP Measures for Post-op Care)  Goal: Off Pathway (Use only if patient is Off Pathway)  Outcome: Progressing Towards Goal  Goal: Activity/Safety  Outcome: Progressing Towards Goal  Goal: Consults, if ordered  Outcome: Progressing Towards Goal  Goal: Diagnostic Test/Procedures  Outcome: Progressing Towards Goal  Goal: Nutrition/Diet  Outcome: Progressing Towards Goal  Goal: Medications  Outcome: Progressing Towards Goal  Goal: Respiratory  Outcome: Progressing Towards Goal  Goal: Treatments/Interventions/Procedures  Outcome: Progressing Towards Goal  Goal: Psychosocial  Outcome: Progressing Towards Goal  Goal: *Optimal pain control at patient's stated goal  Outcome: Progressing Towards Goal  Goal: *Hemodynamically stable  Outcome: Progressing Towards Goal  Goal: *Adequate oxygenation  Outcome: Progressing Towards Goal     Problem: Lower Extremity Fracture:Post-op Day 1  Goal: Off Pathway (Use only if patient is Off Pathway)  Outcome: Progressing Towards Goal  Goal: Activity/Safety  Outcome: Progressing Towards Goal  Goal: Consults, if ordered  Outcome: Progressing Towards Goal  Goal: Diagnostic Test/Procedures  Outcome: Progressing Towards Goal  Goal: Nutrition/Diet  Outcome: Progressing Towards Goal  Goal: Discharge Planning  Outcome: Progressing Towards Goal  Goal: Medications  Outcome: Progressing Towards Goal  Goal: Respiratory  Outcome: Progressing Towards Goal  Goal: Treatments/Interventions/Procedures  Outcome: Progressing Towards Goal  Goal: Psychosocial  Outcome: Progressing Towards Goal  Goal: *Optimal pain control at patient's stated goal  Outcome: Progressing Towards Goal  Goal: *Hemodynamically stable  Outcome: Progressing Towards Goal  Goal: *Adequate oxygenation  Outcome: Progressing Towards Goal  Goal: *PT/INR within defined limits  Outcome: Progressing Towards Goal  Goal: *Demonstrates progressive activity  Outcome: Progressing Towards Goal     Problem: Lower Extremity Fracture:Post-op Day 2  Goal: Off Pathway (Use only if patient is Off Pathway)  Outcome: Progressing Towards Goal  Goal: Activity/Safety  Outcome: Progressing Towards Goal  Goal: Diagnostic Test/Procedures  Outcome: Progressing Towards Goal  Goal: Nutrition/Diet  Outcome: Progressing Towards Goal  Goal: Discharge Planning  Outcome: Progressing Towards Goal  Goal: Medications  Outcome: Progressing Towards Goal  Goal: Respiratory  Outcome: Progressing Towards Goal  Goal: Treatments/Interventions/Procedures  Outcome: Progressing Towards Goal  Goal: Psychosocial  Outcome: Progressing Towards Goal  Goal: *Optimal pain control at patient's stated goal  Outcome: Progressing Towards Goal  Goal: *Hemodynamically stable  Outcome: Progressing Towards Goal  Goal: *Adequate oxygenation  Outcome: Progressing Towards Goal  Goal: *PT/INR within defined limits  Outcome: Progressing Towards Goal  Goal: *Demonstrates progressive activity  Outcome: Progressing Towards Goal  Goal: *Voiding  Outcome: Progressing Towards Goal  Goal: *Bowel movement  Outcome: Progressing Towards Goal  Goal: *Tolerating diet  Outcome: Progressing Towards Goal     Problem: Lower Extremity Fracture:Post-op Day 3  Goal: Off Pathway (Use only if patient is Off Pathway)  Outcome: Progressing Towards Goal  Goal: Activity/Safety  Outcome: Progressing Towards Goal  Goal: Diagnostic Test/Procedures  Outcome: Progressing Towards Goal  Goal: Nutrition/Diet  Outcome: Progressing Towards Goal  Goal: Discharge Planning  Outcome: Progressing Towards Goal  Goal: Medications  Outcome: Progressing Towards Goal  Goal: Respiratory  Outcome: Progressing Towards Goal  Goal: Treatments/Interventions/Procedures  Outcome: Progressing Towards Goal  Goal: Psychosocial  Outcome: Progressing Towards Goal  Goal: *Optimal pain control at patient's stated goal  Outcome: Progressing Towards Goal  Goal: *Hemodynamically stable  Outcome: Progressing Towards Goal  Goal: *Adequate oxygenation  Outcome: Progressing Towards Goal  Goal: *PT/INR within defined limits  Outcome: Progressing Towards Goal  Goal: *Demonstrates progressive activity  Outcome: Progressing Towards Goal  Goal: *Voiding  Outcome: Progressing Towards Goal  Goal: *Bowel movement  Outcome: Progressing Towards Goal  Goal: *Tolerating diet  Outcome: Progressing Towards Goal     Problem: Lower Extremity Fracture:Post-op Day 4  Goal: Off Pathway (Use only if patient is Off Pathway)  Outcome: Progressing Towards Goal  Goal: Activity/Safety  Outcome: Progressing Towards Goal  Goal: Diagnostic Test/Procedures  Outcome: Progressing Towards Goal  Goal: Nutrition/Diet  Outcome: Progressing Towards Goal  Goal: Discharge Planning  Outcome: Progressing Towards Goal  Goal: Medications  Outcome: Progressing Towards Goal  Goal: Respiratory  Outcome: Progressing Towards Goal  Goal: Treatments/Interventions/Procedures  Outcome: Progressing Towards Goal  Goal: Psychosocial  Outcome: Progressing Towards Goal     Problem: Lower Extremity Fracture:Discharge Outcomes  Goal: *Hemodynamically stable  Outcome: Progressing Towards Goal  Goal: *Modified independence with transfers, ambulation on levels with assistance devices, stair climbing, ADL's  Outcome: Progressing Towards Goal  Goal: *Independent with active exercises  Outcome: Progressing Towards Goal  Goal: *Describes follow-up/return visits to physicians  Outcome: Progressing Towards Goal  Goal: *Tolerating diet  Outcome: Progressing Towards Goal  Goal: *Optimal pain control at patient's stated goal  Outcome: Progressing Towards Goal  Goal: *Adequate air exchange  Outcome: Progressing Towards Goal  Goal: *Lungs clear or at baseline  Outcome: Progressing Towards Goal  Goal: *Afebrile  Outcome: Progressing Towards Goal  Goal: *Incision intact without signs of infection, redness, warmth  Outcome: Progressing Towards Goal  Goal: *Absence of deep venous thrombosis signs and symptoms(Stroke Metric)  Outcome: Progressing Towards Goal  Goal: *Active bowel function  Outcome: Progressing Towards Goal  Goal: *Adequate urinary output  Outcome: Progressing Towards Goal  Goal: *Discharge anxiety minimal  Outcome: Progressing Towards Goal  Goal: *Describes available resources and support systems  Outcome: Progressing Towards Goal

## 2021-02-14 NOTE — PROGRESS NOTES
ACUTE PHYSICAL THERAPY GOALS:  (Developed with and agreed upon by patient and/or caregiver.)  STG:  (1.)Ms. Khushboo Dubois will move from supine to sit and sit to supine , scoot up and down and roll side to side with MINIMAL ASSIST within 3 treatment day(s). (2.)Ms. Khushboo Dubois will transfer from bed to chair and chair to bed with MODERATE ASSIST using the least restrictive device within 3 treatment day(s). (3.)Ms. Khushboo Dubois will ambulate with MODERATE ASSIST for 3 feet with the least restrictive device within 3 treatment day(s).      LTG:  (1.)Ms. Khushboo Dubois will move from supine to sit and sit to supine , scoot up and down and roll side to side in bed with CONTACT GUARD ASSIST within 7 treatment day(s). (2.)Ms. Khushboo Dubois will transfer from bed to chair and chair to bed with MINIMAL ASSIST using the least restrictive device within 7 treatment day(s). (3.)Ms. Khushboo Dubois will ambulate with MINIMAL ASSIST for 10+ feet with the least restrictive device within 7 treatment day(s). PHYSICAL THERAPY: Daily Note and PM Treatment Day # 2    Amalia Lauren is a 80 y.o. female   PRIMARY DIAGNOSIS: Closed right ankle fracture, initial encounter  Closed right ankle fracture, initial encounter [S82.891A]  Procedure(s) (LRB):  RIGHT ANKLE OPEN REDUCTION INTERNAL FIXATION / ADMIT 2/11 (Right)  2 Days Post-Op    ASSESSMENT:     REHAB RECOMMENDATIONS: CURRENT LEVEL OF FUNCTION:  (Most Recently Demonstrated)   Recommendation to date pending progress:  Setting:   Short-term Rehab  Equipment:    To Be Determined Bed Mobility:   Minimal Assistance  Sit to Stand:   Minimal Assistance  Transfers:   Moderate Assistance  Gait/Mobility:   Not tested     ASSESSMENT:  Ms. Khushboo Dubois is supine and happy to participate. She performed supine exercises below with good ability and sat up with minimal assist.  She was able to transfer with the walker with assist x1.   Good progress    PM: patient again required moderate assist to take shuffled steps toward the bed. She tends to need help to shift  Her weight onto  Her R leg to move her L. She is a bit anxious but does fairly well. Very pleasant. SUBJECTIVE:   Ms. Meghann Thomas states, Arlette Garzon feels so good to lie down\"    SOCIAL HISTORY/ LIVING ENVIRONMENT: lives at home with grandson (19yo) who goes to school and works. OBJECTIVE:     PAIN: VITAL SIGNS: LINES/DRAINS:   Pre Treatment: Pain Screen  Pain Scale 1: FLACC  Pain Intensity 1: 3  Post Treatment: 0     O2 Device: Room air     MOBILITY: I Mod I S SBA CGA Min Mod Max Total  NT x2 Comments:   Bed Mobility    Rolling [] [] [] [] [] [] [] [] [] [] []    Supine to Sit [] [] [] [] [] [x] [] [] [] [] []    Scooting [] [] [] [x] [] [] [] [] [] [] []    Sit to Supine [] [] [] [] [] [x] [] [] [] [] []    Transfers    Sit to Stand [] [] [] [] [] [x] [] [] [] [] []    Bed to Chair [] [] [] [] [] [] [x] [] [] [] [x]    Stand to Sit [] [] [] [] [x] [] [] [] [] [] []    I=Independent, Mod I=Modified Independent, S=Supervision, SBA=Standby Assistance, CGA=Contact Guard Assistance,   Min=Minimal Assistance, Mod=Moderate Assistance, Max=Maximal Assistance, Total=Total Assistance, NT=Not Tested    GAIT: I Mod I S SBA CGA Min Mod Max Total  NT x2 Comments:   Level of Assistance [] [] [] [] [] [] [] [] [] [x] []    Distance NA    DME Rolling Walker    Gait Quality NA    Weightbearing  Status WBAT  For transfers only   I=Independent, Mod I=Modified Independent, S=Supervision, SBA=Standby Assistance, CGA=Contact Guard Assistance,   Min=Minimal Assistance, Mod=Moderate Assistance, Max=Maximal Assistance, Total=Total Assistance, NT=Not Tested    PLAN:   FREQUENCY/DURATION: PT Plan of Care: BID for duration of hospital stay or until stated goals are met, whichever comes first.  TREATMENT:     TREATMENT:   ($$ Therapeutic Activity: 23-37 mins  $$ Therapeutic Exercises: 8-22 mins    )  Therapeutic Activity (25 Minutes):  Therapeutic activity included Sit to Supine, Scooting and Transfer Training to improve functional Mobility and Strength.         Date:  2/13/21 Date:  2/14/21 Date:     Activity/Exercise Parameters Parameters Parameters   Seated TKE 10x B 10x B    Seated marching 10x B     Seated hip abd 10x B     Supine AP  20x L    Supine heel slides  10x B    Supine SAQ  10x B    Supine hip abd  10x B        AFTER TREATMENT POSITION/PRECAUTIONS:  Alarm Activated, Bed, Needs within reach and RN notified    INTERDISCIPLINARY COLLABORATION:  RN/PCT and PT/PTA    TOTAL TREATMENT DURATION:  PT Patient Time In/Time Out  Time In: 1315  Time Out: 200 [facility]

## 2021-02-14 NOTE — PROGRESS NOTES
2021         Post Op day: 2 Days Post-Op   Admit Diagnosis: Closed right ankle fracture, initial encounter [Q56.144M]  LAB:    Recent Results (from the past 24 hour(s))   METABOLIC PANEL, BASIC    Collection Time: 21  3:59 AM   Result Value Ref Range    Sodium 136 136 - 145 mmol/L    Potassium 4.0 3.5 - 5.1 mmol/L    Chloride 106 98 - 107 mmol/L    CO2 23 21 - 32 mmol/L    Anion gap 7 7 - 16 mmol/L    Glucose 107 (H) 65 - 100 mg/dL    BUN 15 8 - 23 MG/DL    Creatinine 0.98 0.6 - 1.0 MG/DL    GFR est AA >60 >60 ml/min/1.73m2    GFR est non-AA 57 (L) >60 ml/min/1.73m2    Calcium 8.6 8.3 - 10.4 MG/DL   CBC WITH AUTOMATED DIFF    Collection Time: 21  3:59 AM   Result Value Ref Range    WBC 7.0 4.3 - 11.1 K/uL    RBC 2.63 (L) 4.05 - 5.2 M/uL    HGB 8.5 (L) 11.7 - 15.4 g/dL    HCT 26.6 (L) 35.8 - 46.3 %    .1 (H) 79.6 - 97.8 FL    MCH 32.3 26.1 - 32.9 PG    MCHC 32.0 31.4 - 35.0 g/dL    RDW 13.5 11.9 - 14.6 %    PLATELET 838 476 - 015 K/uL    MPV 10.2 9.4 - 12.3 FL    ABSOLUTE NRBC 0.02 0.0 - 0.2 K/uL    DF AUTOMATED      NEUTROPHILS 61 43 - 78 %    LYMPHOCYTES 19 13 - 44 %    MONOCYTES 16 (H) 4.0 - 12.0 %    EOSINOPHILS 1 0.5 - 7.8 %    BASOPHILS 0 0.0 - 2.0 %    IMMATURE GRANULOCYTES 2 0.0 - 5.0 %    ABS. NEUTROPHILS 4.3 1.7 - 8.2 K/UL    ABS. LYMPHOCYTES 1.4 0.5 - 4.6 K/UL    ABS. MONOCYTES 1.1 0.1 - 1.3 K/UL    ABS. EOSINOPHILS 0.1 0.0 - 0.8 K/UL    ABS. BASOPHILS 0.0 0.0 - 0.2 K/UL    ABS. IMM.  GRANS. 0.1 0.0 - 0.5 K/UL   PLEASE READ & DOCUMENT PPD TEST IN 48 HRS    Collection Time: 21  5:08 AM   Result Value Ref Range    PPD Negative Negative    mm Induration 0 0 - 5 mm     Vital Signs:    Patient Vitals for the past 8 hrs:   BP Temp Pulse Resp SpO2   21 0415 137/67 97.7 °F (36.5 °C) 80 20 95 %   21 0014 132/60 98.6 °F (37 °C) 84 20 93 %     Temp (24hrs), Av.1 °F (36.7 °C), Min:97.3 °F (36.3 °C), Max:98.8 °F (37.1 °C)    Pain Control:   Pain Assessment  Pain Scale 1: FLACC  Pain Intensity 1: 0  Subjective: Doing well, pain is well controlled, no complaints     Objective:  No Acute Distress, Alert and Oriented, right short leg cast is fitting comfortably. Neurovascular exam is normal.  Wiggles toes which are warm, pink, with brisk cap refill. Assessment:   Patient Active Problem List   Diagnosis Code    Closed right ankle fracture, initial encounter S82.891A    Hypertension I10    Hyperlipidemia E78.5    Breast cancer ([facility] C50.919    Colon cancer ([facility] C18.9    Frequent falls R29.6       Status Post Procedure(s) (LRB):  RIGHT ANKLE OPEN REDUCTION INTERNAL FIXATION / ADMIT 2/11 (Right)        Plan: Continue PT/OT, Monitor Hgb. Awaiting rehab placement.    Signed By: KORY Bullock

## 2021-02-14 NOTE — PROGRESS NOTES
ACUTE PHYSICAL THERAPY GOALS:  (Developed with and agreed upon by patient and/or caregiver.)  STG:  (1.)Ms. Luana Ling will move from supine to sit and sit to supine , scoot up and down and roll side to side with MINIMAL ASSIST within 3 treatment day(s). (2.)Ms. Luana Ling will transfer from bed to chair and chair to bed with MODERATE ASSIST using the least restrictive device within 3 treatment day(s). (3.)Ms. Luana Ling will ambulate with MODERATE ASSIST for 3 feet with the least restrictive device within 3 treatment day(s).      LTG:  (1.)Ms. Luana Ling will move from supine to sit and sit to supine , scoot up and down and roll side to side in bed with CONTACT GUARD ASSIST within 7 treatment day(s). (2.)Ms. Luana Ling will transfer from bed to chair and chair to bed with MINIMAL ASSIST using the least restrictive device within 7 treatment day(s). (3.)Ms. Luana Ling will ambulate with MINIMAL ASSIST for 10+ feet with the least restrictive device within 7 treatment day(s). PHYSICAL THERAPY: Daily Note and AM Treatment Day # 2    John Hanson is a 80 y.o. female   PRIMARY DIAGNOSIS: Closed right ankle fracture, initial encounter  Closed right ankle fracture, initial encounter [S82.891A]  Procedure(s) (LRB):  RIGHT ANKLE OPEN REDUCTION INTERNAL FIXATION / ADMIT 2/11 (Right)  2 Days Post-Op    ASSESSMENT:     REHAB RECOMMENDATIONS: CURRENT LEVEL OF FUNCTION:  (Most Recently Demonstrated)   Recommendation to date pending progress:  Setting:   Short-term Rehab  Equipment:    To Be Determined Bed Mobility:   Minimal Assistance  Sit to Stand:   Minimal Assistance  Transfers:   Moderate Assistance  Gait/Mobility:   Not tested     ASSESSMENT:  Ms. Luana Ling is supine and happy to participate. She performed supine exercises below with good ability and sat up with minimal assist.  She was able to transfer with the walker with assist x1.   Good progress     SUBJECTIVE:   Ms. Luana Ling states, \"I didn't realize I already had surgery. \"    SOCIAL HISTORY/ LIVING ENVIRONMENT: lives at home with grandson (19yo) who goes to school and works. OBJECTIVE:     PAIN: VITAL SIGNS: LINES/DRAINS:   Pre Treatment: Pain Screen  Pain Scale 1: FLACC  Pain Intensity 1: 0  Post Treatment: 0     O2 Device: Room air     MOBILITY: I Mod I S SBA CGA Min Mod Max Total  NT x2 Comments:   Bed Mobility    Rolling [] [] [] [] [] [] [] [] [] [] []    Supine to Sit [] [] [] [] [] [x] [] [] [] [] []    Scooting [] [] [] [x] [] [] [] [] [] [] []    Sit to Supine [] [] [] [] [] [] [] [] [] [] []    Transfers    Sit to Stand [] [] [] [] [] [x] [] [] [] [] []    Bed to Chair [] [] [] [] [] [] [x] [] [] [] [x]    Stand to Sit [] [] [] [] [x] [] [] [] [] [] []    I=Independent, Mod I=Modified Independent, S=Supervision, SBA=Standby Assistance, CGA=Contact Guard Assistance,   Min=Minimal Assistance, Mod=Moderate Assistance, Max=Maximal Assistance, Total=Total Assistance, NT=Not Tested    GAIT: I Mod I S SBA CGA Min Mod Max Total  NT x2 Comments:   Level of Assistance [] [] [] [] [] [] [] [] [] [x] []    Distance NA    DME Rolling Walker    Gait Quality NA    Weightbearing  Status WBAT  For transfers only   I=Independent, Mod I=Modified Independent, S=Supervision, SBA=Standby Assistance, CGA=Contact Guard Assistance,   Min=Minimal Assistance, Mod=Moderate Assistance, Max=Maximal Assistance, Total=Total Assistance, NT=Not Tested    PLAN:   FREQUENCY/DURATION: PT Plan of Care: BID for duration of hospital stay or until stated goals are met, whichever comes first.  TREATMENT:     TREATMENT:   ($$ Therapeutic Activity: 8-22 mins  $$ Therapeutic Exercises: 8-22 mins    )  Therapeutic Activity (15 Minutes): Therapeutic activity included Sit to Supine, Scooting and Transfer Training to improve functional Mobility and Strength. Therapeutic Exercise: (  10 minutes):  Exercises per grid below to improve mobility and strength.   Required minimal visual and verbal cues to promote proper body mechanics. Progressed complexity of movement as indicated.      Date:  2/13/21 Date:  2/14/21 Date:     Activity/Exercise Parameters Parameters Parameters   Seated TKE 10x B     Seated marching 10x B     Seated hip abd 10x B     Supine AP 20x L     Supine heel slides 10x B     Supine SAQ 10x B     Supine hip abd 10x B         AFTER TREATMENT POSITION/PRECAUTIONS:  Chair, Needs within reach and RN notified    INTERDISCIPLINARY COLLABORATION:  RN/PCT and PT/PTA    TOTAL TREATMENT DURATION:  PT Patient Time In/Time Out  Time In: 1000  Time Out: 1030    Guido Vasquez PTA

## 2021-02-14 NOTE — PROGRESS NOTES
Hospitalist Note     Admit Date:  2021  2:04 AM   Name:  Andi Alexis   Age:  80 y.o.  :  1932   MRN:  [id number]   PCP:  Kirill Charles MD  Treatment Team: Attending Provider: Carol Canada MD; Care Manager: Shonna Jara, JUAN J; Hospitalist: Jose Juan Arango MD; Utilization Review: Amanuel Dewey RN; Charge Nurse: Dmitri Wooten; Physical Therapy Assistant: Kelly Duncan PTA    HPI/Subjective:   Ms. Sumit Martínez is an 81 y/o female with a history of HTN, HLD, breast cancer (1970s), colon cancer (6-7 years ago) who was seen in the ER on 2/10 with complaints of right knee and right ankle pain after a mechanical fall. Her floors were just cleaned/waxed and she got up and thinks she slipped on the floor and fell onto her right leg. Plain films at that time showed a dislocated right ankle fracture along with a non-displaced fracture of the proximal fibula. The ankle was reduced successfully on follow up X-ray. She was referred to Ortho. She was admitted on  for planned ORIF of her right bimalleolar fracture. Surgery was this morning and was uncomplicated. She is seen and examined at the bedside in her room. Son, Rudi Casanova, is at the bedside. She feels well. No headaches, syncope, dizziness, chest pain, palpitations, SOB/ALICEA, N/V/D, abdominal pain, peripheral edema. Does c/o RLE pain. Hospitalists consulted for medical management. : Pt seen and examined at bedside. Pt sleeping comfortably this Am. She denies any fevers, chills, chest pain or pressure, SOB, lightheadedness, dizziness, palpitations, near syncope or syncope. Pain controlled in lower extremity.     No other complaints  Objective:     Patient Vitals for the past 24 hrs:   Temp Pulse Resp BP SpO2   21 0858 98.5 °F (36.9 °C) 86 18 (!) 146/74 97 %   21 0415 97.7 °F (36.5 °C) 80 20 137/67 95 %   21 0014 98.6 °F (37 °C) 84 20 132/60 93 %   21 98.8 °F (37.1 °C) 91 20 (!) 92/52 95 %   21 1652 97.3 °F (36.3 °C) 87 20 (!) 131/55 96 %   02/13/21 1130 98.1 °F (36.7 °C) 84 18 128/67 97 %     Oxygen Therapy  O2 Sat (%): 97 % (02/14/21 0858)  Pulse via Oximetry: 83 beats per minute (02/12/21 1552)  O2 Device: Room air (02/12/21 1552)  O2 Flow Rate (L/min): 3 l/min (02/12/21 0858)    Estimated body mass index is 28.19 kg/m² as calculated from the following:    Height as of this encounter: 5' 5.5\" (1.664 m). Weight as of this encounter: 78 kg (172 lb). No intake or output data in the 24 hours ending 02/14/21 1033    *Note that automatically entered I/Os may not be accurate; dependent on patient compliance with collection and accurate  by techs. General:    Well nourished. Alert. CV:   RRR. No murmur, rub, or gallop. Lungs:   CTAB. No wheezing, rhonchi, or rales. Abdomen:   Soft, nontender, nondistended. Extremities: Warm and dry. No cyanosis or edema. RLE in hard cast from knee to ankle. Skin:     No rashes or jaundice.    Neuro:  No gross focal deficits    Data Reviewed:  I have reviewed all labs, meds, and studies from the last 24 hours:  Recent Results (from the past 24 hour(s))   METABOLIC PANEL, BASIC    Collection Time: 02/14/21  3:59 AM   Result Value Ref Range    Sodium 136 136 - 145 mmol/L    Potassium 4.0 3.5 - 5.1 mmol/L    Chloride 106 98 - 107 mmol/L    CO2 23 21 - 32 mmol/L    Anion gap 7 7 - 16 mmol/L    Glucose 107 (H) 65 - 100 mg/dL    BUN 15 8 - 23 MG/DL    Creatinine 0.98 0.6 - 1.0 MG/DL    GFR est AA >60 >60 ml/min/1.73m2    GFR est non-AA 57 (L) >60 ml/min/1.73m2    Calcium 8.6 8.3 - 10.4 MG/DL   CBC WITH AUTOMATED DIFF    Collection Time: 02/14/21  3:59 AM   Result Value Ref Range    WBC 7.0 4.3 - 11.1 K/uL    RBC 2.63 (L) 4.05 - 5.2 M/uL    HGB 8.5 (L) 11.7 - 15.4 g/dL    HCT 26.6 (L) 35.8 - 46.3 %    .1 (H) 79.6 - 97.8 FL    MCH 32.3 26.1 - 32.9 PG    MCHC 32.0 31.4 - 35.0 g/dL    RDW 13.5 11.9 - 14.6 %    PLATELET 360 102 - 047 K/uL    MPV 10.2 9.4 - 12.3 FL    ABSOLUTE NRBC 0.02 0.0 - 0.2 K/uL    DF AUTOMATED      NEUTROPHILS 61 43 - 78 %    LYMPHOCYTES 19 13 - 44 %    MONOCYTES 16 (H) 4.0 - 12.0 %    EOSINOPHILS 1 0.5 - 7.8 %    BASOPHILS 0 0.0 - 2.0 %    IMMATURE GRANULOCYTES 2 0.0 - 5.0 %    ABS. NEUTROPHILS 4.3 1.7 - 8.2 K/UL    ABS. LYMPHOCYTES 1.4 0.5 - 4.6 K/UL    ABS. MONOCYTES 1.1 0.1 - 1.3 K/UL    ABS. EOSINOPHILS 0.1 0.0 - 0.8 K/UL    ABS. BASOPHILS 0.0 0.0 - 0.2 K/UL    ABS. IMM. GRANS. 0.1 0.0 - 0.5 K/UL   PLEASE READ & DOCUMENT PPD TEST IN 48 HRS    Collection Time: 02/14/21  5:08 AM   Result Value Ref Range    PPD Negative Negative    mm Induration 0 0 - 5 mm        Current Meds:  Current Facility-Administered Medications   Medication Dose Route Frequency    amLODIPine (NORVASC) tablet 5 mg  5 mg Oral DAILY    acetaminophen (TYLENOL) tablet 650 mg  650 mg Oral Q8H    HYDROmorphone (PF) (DILAUDID) injection 0.5 mg  0.5 mg IntraVENous Q4H PRN    oxyCODONE IR (ROXICODONE) tablet 5 mg  5 mg Oral Q3H PRN    sodium chloride (NS) flush 5-40 mL  5-40 mL IntraVENous Q8H    sodium chloride (NS) flush 5-40 mL  5-40 mL IntraVENous PRN    naloxone (NARCAN) injection 0.2-0.4 mg  0.2-0.4 mg IntraVENous Q10MIN PRN    ondansetron (ZOFRAN) injection 4 mg  4 mg IntraVENous Q4H PRN    diphenhydrAMINE (BENADRYL) capsule 25 mg  25 mg Oral Q4H PRN    senna-docusate (PERICOLACE) 8.6-50 mg per tablet 2 Tab  2 Tab Oral DAILY    pravastatin (PRAVACHOL) tablet 40 mg  40 mg Oral QHS       Other Studies:  No results found for this visit on 02/12/21. No results found. All Micro Results     Procedure Component Value Units Date/Time    COVID-19 RAPID TEST [[id number]] Collected: 02/13/21 0539    Order Status: Completed Specimen: Nasopharyngeal Updated: 02/13/21 0603     Specimen source NASAL        COVID-19 rapid test Not detected        Comment:      The specimen is NEGATIVE for SARS-CoV-2, the novel coronavirus associated with COVID-19.   A negative result does not rule out COVID-19. This test has been authorized by the FDA under an Emergency Use Authorization (EUA) for use by authorized laboratories. Fact sheet for Healthcare Providers: ConventionUpdate.co.nz  Fact sheet for Patients: ConventionUpdate.co.nz       Methodology: Isothermal Nucleic Acid Amplification               SARS-CoV-2 Lab Results  \"Novel Coronavirus\" Test: No results found for: COV2NT   \"Emergent Disease\" Test: No results found for: EDPR  \"SARS-COV-2\" Test: No results found for: XGCOVT  Rapid Test:   Lab Results   Component Value Date/Time    COVR Not detected 02/13/2021 05:39 AM            Assessment and Plan:     Hospital Problems as of 2/14/2021 Date Reviewed: 2/12/2021          Codes Class Noted - Resolved POA    * (Principal) Closed right ankle fracture, initial encounter ICD-10-CM: S82.891A  ICD-9-CM: 824.8  2/12/2021 - Present Unknown        Hypertension ICD-10-CM: I10  ICD-9-CM: 401.9  2/12/2021 - Present Unknown        Hyperlipidemia ICD-10-CM: E78.5  ICD-9-CM: 272.4  2/12/2021 - Present Unknown        Breast cancer ([facility] [address] ICD-10-CM: C50.919  ICD-9-CM: 174.9  2/12/2021 - Present Unknown        [facility] ([facility] ICD-10-CM: C18.9  ICD-9-CM: 153.9  2/12/2021 - Present Unknown        Frequent falls ICD-10-CM: R29.6  ICD-9-CM: V15.88  2/12/2021 - Present Unknown              Plan:  # Right bimalleolar fracture/frequent falls              - S/p ORIF with Ortho on 2/12. General mgmt per primary. Therapies, likely needs rehab.      # HTN   - Bp slightly increased overnight, but with some hypotension as well   - titrate back on home meds as able   - cont home norvasc     # HLD              - Resume pravastatin     # H/o breast and colon cancer     Other listed chronic conditions stable, continue current management.     Son, Geovanna Thomson, can be reached at [phone].     Diet:  DIET REGULAR  DVT ppx:  Per ortho    Signed:  Christy Haile MD

## 2021-02-15 VITALS
OXYGEN SATURATION: 96 % | TEMPERATURE: 97.9 F | BODY MASS INDEX: 28.73 KG/M2 | RESPIRATION RATE: 18 BRPM | HEIGHT: 66 IN | SYSTOLIC BLOOD PRESSURE: 159 MMHG | HEART RATE: 91 BPM | WEIGHT: 178.8 LBS | DIASTOLIC BLOOD PRESSURE: 71 MMHG

## 2021-02-15 LAB
COVID-19 RAPID TEST, COVR: NOT DETECTED
HCT VFR BLD AUTO: 26.1 % (ref 35.8–46.3)
HGB BLD-MCNC: 8.4 G/DL (ref 11.7–15.4)
MM INDURATION POC: 0 MM (ref 0–5)
PPD POC: NEGATIVE NEGATIVE
SARS COV-2, XPGCVT: ABNORMAL
SARS-COV-2, COV2: NORMAL
SOURCE, COVRS: NORMAL

## 2021-02-15 PROCEDURE — 74011250637 HC RX REV CODE- 250/637: Performed by: STUDENT IN AN ORGANIZED HEALTH CARE EDUCATION/TRAINING PROGRAM

## 2021-02-15 PROCEDURE — 36415 COLL VENOUS BLD VENIPUNCTURE: CPT

## 2021-02-15 PROCEDURE — 85018 HEMOGLOBIN: CPT

## 2021-02-15 PROCEDURE — 2709999900 HC NON-CHARGEABLE SUPPLY

## 2021-02-15 PROCEDURE — 74011250637 HC RX REV CODE- 250/637: Performed by: PHYSICIAN ASSISTANT

## 2021-02-15 PROCEDURE — 74011250637 HC RX REV CODE- 250/637: Performed by: ORTHOPAEDIC SURGERY

## 2021-02-15 PROCEDURE — 87635 SARS-COV-2 COVID-19 AMP PRB: CPT

## 2021-02-15 RX ORDER — LISINOPRIL 20 MG/1
20 TABLET ORAL DAILY
Status: DISCONTINUED | OUTPATIENT
Start: 2021-02-15 | End: 2021-02-15 | Stop reason: HOSPADM

## 2021-02-15 RX ORDER — ACETAMINOPHEN 325 MG/1
650 TABLET ORAL EVERY 8 HOURS
Qty: 180 TAB | Refills: 0 | Status: SHIPPED
Start: 2021-02-15 | End: 2021-03-17

## 2021-02-15 RX ORDER — OXYCODONE HYDROCHLORIDE 5 MG/1
5 TABLET ORAL
Qty: 24 TAB | Refills: 0 | Status: SHIPPED | OUTPATIENT
Start: 2021-02-15 | End: 2021-02-18

## 2021-02-15 RX ADMIN — AMLODIPINE BESYLATE 5 MG: 5 TABLET ORAL at 07:52

## 2021-02-15 RX ADMIN — POLYETHYLENE GLYCOL 3350 17 G: 17 POWDER, FOR SOLUTION ORAL at 09:00

## 2021-02-15 RX ADMIN — SENNOSIDES AND DOCUSATE SODIUM 2 TABLET: 8.6; 5 TABLET ORAL at 07:52

## 2021-02-15 RX ADMIN — ACETAMINOPHEN 650 MG: 325 TABLET ORAL at 04:59

## 2021-02-15 RX ADMIN — LISINOPRIL 20 MG: 20 TABLET ORAL at 11:44

## 2021-02-15 RX ADMIN — Medication 10 ML: at 04:59

## 2021-02-15 NOTE — DISCHARGE SUMMARY
Joint Discharge Summary      Patient ID:  Renetta Swain  [id number]  82 y.o.  6/13/1932    Admit date: 2/12/2021  Discharge date and time:    Admitting Physician: Belinda Medrano MD  Surgeon: Martir Mooney Course    Admission Diagnoses: Pre op diagnosis: Closed bimalleolar fracture of right ankle, initial encounter [S82.778R]  Prior to surgery the patient was seen for consultation in the office or hospital and a complete history and physical was taken as it pertained to their condition. Discharge Diagnoses: Closed right ankle fracture, initial encounter. Chronic and Acute medical problems addressed during this hospital stay by consulting physicians include: They underwent Procedure(s) (LRB):  RIGHT ANKLE OPEN REDUCTION INTERNAL FIXATION / ADMIT 2/11 (Right) for this. Principle Problem: Closed right ankle fracture, initial encounter. Other Chronic and Acute Medical Issues: managed by the hospitalist during admission included: Principal Problem:    Closed right ankle fracture, initial encounter (2/12/2021)    Active Problems:    Hypertension (2/12/2021)      Hyperlipidemia (2/12/2021)      Breast cancer ([facility] [address] (2/12/2021)      Colon cancer ([facility] [address] (2/12/2021)      Frequent falls (2/12/2021)                                 Perioperative Antibiotics:  Prior to surgery Ancef 1 to 2 mg was given depending on patient's weight and allergies.   If the patient was allergic to Ancef or MRSA positive  the patient was given Vancomycin and Cleocin        Hospital Medications:   Current Facility-Administered Medications   Medication Dose Route Frequency    polyethylene glycol (MIRALAX) packet 17 g  17 g Oral DAILY    amLODIPine (NORVASC) tablet 5 mg  5 mg Oral DAILY    acetaminophen (TYLENOL) tablet 650 mg  650 mg Oral Q8H    HYDROmorphone (PF) (DILAUDID) injection 0.5 mg  0.5 mg IntraVENous Q4H PRN    oxyCODONE IR (ROXICODONE) tablet 5 mg  5 mg Oral Q3H PRN    sodium chloride (NS) flush 5-40 mL  5-40 mL IntraVENous Q8H    sodium chloride (NS) flush 5-40 mL  5-40 mL IntraVENous PRN    naloxone (NARCAN) injection 0.2-0.4 mg  0.2-0.4 mg IntraVENous Q10MIN PRN    ondansetron (ZOFRAN) injection 4 mg  4 mg IntraVENous Q4H PRN    diphenhydrAMINE (BENADRYL) capsule 25 mg  25 mg Oral Q4H PRN    senna-docusate (PERICOLACE) 8.6-50 mg per tablet 2 Tab  2 Tab Oral DAILY    pravastatin (PRAVACHOL) tablet 40 mg  40 mg Oral QHS         Postoperative Blood Report: If the patient received blood products during their admission they are listed below:     Lab Results   Component Value Date/Time    Crossmatch Expiration 02/15/2021,2359 02/12/2021 07:14 AM     Lab Results   Component Value Date/Time    ABO/Rh(D) B POSITIVE 02/12/2021 07:14 AM     Lab Results   Component Value Date/Time    ABO/Rh(D) B POSITIVE 02/12/2021 07:14 AM     No results found for: PCTUN  No results found for: PCTCT  No results found for: PCTUDIV  No results found for: PCTXM    Post Op complications: none       Physical Therapy: PT was started on the day of surgery and progressed. PT/OT:          Assistive Device: Fall prevention device                  Disposition: Good    Upon Discharge: The wound appears to be healing without any evidence of infection. Hemoglobin at discharge:   Lab Results   Component Value Date/Time    HGB 8.4 (L) 02/15/2021 05:13 AM       Pt Discharged to: [facility].     Discharge instructions:  - Refer to the Medication Reconciliation sheet for all discharge medications   -Rx pain medication given   -Resume pre hospital diet              -Weight bearing as tolerated for transfers with assistance   -Follow up in office as scheduled       Signed:  KORY Scott  2/15/2021  8:08 AM

## 2021-02-15 NOTE — PROGRESS NOTES
TRANSFER - OUT REPORT:    Verbal report given to Mike Duncan RN on [employer]  being transferred to [employer] (unit) for routine progression of care       Report consisted of patients Situation, Background, Assessment and   Recommendations(SBAR). Information from the following report(s) SBAR, [employer], [facility] and Recent Results was reviewed with the receiving nurse. Lines:   Peripheral IV 02/12/21 Right Forearm (Active)   Site Assessment Clean, dry, & intact 02/15/21 0728   Phlebitis Assessment 0 02/15/21 0728   Infiltration Assessment 0 02/15/21 0728   Dressing Status Clean, dry, & intact 02/15/21 0728   Dressing Type Transparent;Tape 02/15/21 0728   Hub Color/Line Status Patent 02/15/21 1348        Opportunity for questions and clarification was provided.       Patient transported with:   [facility]

## 2021-02-15 NOTE — PROGRESS NOTES
Problem: Falls - Risk of  Goal: *Absence of Falls  Description: Document Gus Turner Fall Risk and appropriate interventions in the flowsheet. Outcome: Progressing Towards Goal  Note: Fall Risk Interventions:  Mobility Interventions: Bed/chair exit alarm    Mentation Interventions: Adequate sleep, hydration, pain control    Medication Interventions: Bed/chair exit alarm    Elimination Interventions: Bed/chair exit alarm, Call light in reach, Elevated toilet seat, Patient to call for help with toileting needs, Stay With Me (per policy), Toilet paper/wipes in reach, Toileting schedule/hourly rounds    History of Falls Interventions: Bed/chair exit alarm         Problem: Patient Education: Go to Patient Education Activity  Goal: Patient/Family Education  Outcome: Progressing Towards Goal     Problem: Pressure Injury - Risk of  Goal: *Prevention of pressure injury  Description: Document Arnulfo Scale and appropriate interventions in the flowsheet.   Outcome: Progressing Towards Goal  Note: Pressure Injury Interventions:  Sensory Interventions: Assess changes in LOC    Moisture Interventions: Absorbent underpads    Activity Interventions: Increase time out of bed, PT/OT evaluation    Mobility Interventions: HOB 30 degrees or less    Nutrition Interventions: Document food/fluid/supplement intake, Offer support with meals,snacks and hydration    Friction and Shear Interventions: HOB 30 degrees or less                Problem: Patient Education: Go to Patient Education Activity  Goal: Patient/Family Education  Outcome: Progressing Towards Goal     Problem: Patient Education: Go to Patient Education Activity  Goal: Patient/Family Education  Outcome: Progressing Towards Goal     Problem: Lower Extremity Fracture:Day of Admission  Goal: Off Pathway (Use only if patient is Off Pathway)  Outcome: Progressing Towards Goal  Goal: Activity/Safety  Outcome: Progressing Towards Goal  Goal: Consults, if ordered  Outcome: Progressing Towards Goal  Goal: Diagnostic Test/Procedures  Outcome: Progressing Towards Goal  Goal: Nutrition/Diet  Outcome: Progressing Towards Goal  Goal: Medications  Outcome: Progressing Towards Goal  Goal: Respiratory  Outcome: Progressing Towards Goal  Goal: Treatments/Interventions/Procedures  Outcome: Progressing Towards Goal  Goal: Psychosocial  Outcome: Progressing Towards Goal  Goal: *Optimal pain control at patient's stated goal  Outcome: Progressing Towards Goal  Goal: *Hemodynamically stable  Outcome: Progressing Towards Goal  Goal: *Adequate oxygenation  Outcome: Progressing Towards Goal     Problem: Lower Extremity Fracture:Day of Surgery (Intiate SCIP Measures for Post-op Care)  Goal: Off Pathway (Use only if patient is Off Pathway)  Outcome: Progressing Towards Goal  Goal: Activity/Safety  Outcome: Progressing Towards Goal  Goal: Consults, if ordered  Outcome: Progressing Towards Goal  Goal: Diagnostic Test/Procedures  Outcome: Progressing Towards Goal  Goal: Nutrition/Diet  Outcome: Progressing Towards Goal  Goal: Medications  Outcome: Progressing Towards Goal  Goal: Respiratory  Outcome: Progressing Towards Goal  Goal: Treatments/Interventions/Procedures  Outcome: Progressing Towards Goal  Goal: Psychosocial  Outcome: Progressing Towards Goal  Goal: *Optimal pain control at patient's stated goal  Outcome: Progressing Towards Goal  Goal: *Hemodynamically stable  Outcome: Progressing Towards Goal  Goal: *Adequate oxygenation  Outcome: Progressing Towards Goal     Problem: Lower Extremity Fracture:Post-op Day 1  Goal: Off Pathway (Use only if patient is Off Pathway)  Outcome: Progressing Towards Goal  Goal: Activity/Safety  Outcome: Progressing Towards Goal  Goal: Consults, if ordered  Outcome: Progressing Towards Goal  Goal: Diagnostic Test/Procedures  Outcome: Progressing Towards Goal  Goal: Nutrition/Diet  Outcome: Progressing Towards Goal  Goal: Discharge Planning  Outcome: Progressing Towards Goal  Goal: Medications  Outcome: Progressing Towards Goal  Goal: Respiratory  Outcome: Progressing Towards Goal  Goal: Treatments/Interventions/Procedures  Outcome: Progressing Towards Goal  Goal: Psychosocial  Outcome: Progressing Towards Goal  Goal: *Optimal pain control at patient's stated goal  Outcome: Progressing Towards Goal  Goal: *Hemodynamically stable  Outcome: Progressing Towards Goal  Goal: *Adequate oxygenation  Outcome: Progressing Towards Goal  Goal: *PT/INR within defined limits  Outcome: Progressing Towards Goal  Goal: *Demonstrates progressive activity  Outcome: Progressing Towards Goal     Problem: Lower Extremity Fracture:Post-op Day 2  Goal: Off Pathway (Use only if patient is Off Pathway)  Outcome: Progressing Towards Goal  Goal: Activity/Safety  Outcome: Progressing Towards Goal  Goal: Diagnostic Test/Procedures  Outcome: Progressing Towards Goal  Goal: Nutrition/Diet  Outcome: Progressing Towards Goal  Goal: Discharge Planning  Outcome: Progressing Towards Goal  Goal: Medications  Outcome: Progressing Towards Goal  Goal: Respiratory  Outcome: Progressing Towards Goal  Goal: Treatments/Interventions/Procedures  Outcome: Progressing Towards Goal  Goal: Psychosocial  Outcome: Progressing Towards Goal  Goal: *Optimal pain control at patient's stated goal  Outcome: Progressing Towards Goal  Goal: *Hemodynamically stable  Outcome: Progressing Towards Goal  Goal: *Adequate oxygenation  Outcome: Progressing Towards Goal  Goal: *PT/INR within defined limits  Outcome: Progressing Towards Goal  Goal: *Demonstrates progressive activity  Outcome: Progressing Towards Goal  Goal: *Voiding  Outcome: Progressing Towards Goal  Goal: *Bowel movement  Outcome: Progressing Towards Goal  Goal: *Tolerating diet  Outcome: Progressing Towards Goal     Problem: Lower Extremity Fracture:Post-op Day 3  Goal: Off Pathway (Use only if patient is Off Pathway)  Outcome: Progressing Towards Goal  Goal: Activity/Safety  Outcome: Progressing Towards Goal  Goal: Diagnostic Test/Procedures  Outcome: Progressing Towards Goal  Goal: Nutrition/Diet  Outcome: Progressing Towards Goal  Goal: Discharge Planning  Outcome: Progressing Towards Goal  Goal: Medications  Outcome: Progressing Towards Goal  Goal: Respiratory  Outcome: Progressing Towards Goal  Goal: Treatments/Interventions/Procedures  Outcome: Progressing Towards Goal  Goal: Psychosocial  Outcome: Progressing Towards Goal  Goal: *Optimal pain control at patient's stated goal  Outcome: Progressing Towards Goal  Goal: *Hemodynamically stable  Outcome: Progressing Towards Goal  Goal: *Adequate oxygenation  Outcome: Progressing Towards Goal  Goal: *PT/INR within defined limits  Outcome: Progressing Towards Goal  Goal: *Demonstrates progressive activity  Outcome: Progressing Towards Goal  Goal: *Voiding  Outcome: Progressing Towards Goal  Goal: *Bowel movement  Outcome: Progressing Towards Goal  Goal: *Tolerating diet  Outcome: Progressing Towards Goal     Problem: Lower Extremity Fracture:Post-op Day 4  Goal: Off Pathway (Use only if patient is Off Pathway)  Outcome: Progressing Towards Goal  Goal: Activity/Safety  Outcome: Progressing Towards Goal  Goal: Diagnostic Test/Procedures  Outcome: Progressing Towards Goal  Goal: Nutrition/Diet  Outcome: Progressing Towards Goal  Goal: Discharge Planning  Outcome: Progressing Towards Goal  Goal: Medications  Outcome: Progressing Towards Goal  Goal: Respiratory  Outcome: Progressing Towards Goal  Goal: Treatments/Interventions/Procedures  Outcome: Progressing Towards Goal  Goal: Psychosocial  Outcome: Progressing Towards Goal     Problem: Lower Extremity Fracture:Discharge Outcomes  Goal: *Hemodynamically stable  Outcome: Progressing Towards Goal  Goal: *Modified independence with transfers, ambulation on levels with assistance devices, stair climbing, ADL's  Outcome: Progressing Towards Goal  Goal: *Independent with active exercises  Outcome: Progressing Towards Goal  Goal: *Describes follow-up/return visits to physicians  Outcome: Progressing Towards Goal  Goal: *Tolerating diet  Outcome: Progressing Towards Goal  Goal: *Optimal pain control at patient's stated goal  Outcome: Progressing Towards Goal  Goal: *Adequate air exchange  Outcome: Progressing Towards Goal  Goal: *Lungs clear or at baseline  Outcome: Progressing Towards Goal  Goal: *Afebrile  Outcome: Progressing Towards Goal  Goal: *Incision intact without signs of infection, redness, warmth  Outcome: Progressing Towards Goal  Goal: *Absence of deep venous thrombosis signs and symptoms(Stroke Metric)  Outcome: Progressing Towards Goal  Goal: *Active bowel function  Outcome: Progressing Towards Goal  Goal: *Adequate urinary output  Outcome: Progressing Towards Goal  Goal: *Discharge anxiety minimal  Outcome: Progressing Towards Goal  Goal: *Describes available resources and support systems  Outcome: Progressing Towards Goal

## 2021-02-15 NOTE — PROGRESS NOTES
Rapid Covid negative. Patient can discharge to room 309 at [facility]. Transport set up for 1300 and primary RN will call report to [phone]. Message left for daughter in law. Care Management Interventions  PCP Verified by CM: Yes([facility])  Mode of Transport at Discharge: [address]  [address] Time of Discharge: [address] (CM Consult): Discharge Planning, SNF  Physical Therapy Consult: Yes  Occupational Therapy Consult: Yes  Speech Therapy Consult: No  Current Support Network: Own Home, Lives with Caregiver, Family Lives [place] year old grandson lives with patient.)  Confirm Follow Up Transport: Family  The Plan for Transition of Care is Related to the Following Treatment Goals : [facility] in order to return to baseline independence in ADLs.    The Patient and/or Patient Representative was Provided with a Choice of Provider and Agrees with the Discharge Plan?: Yes  Freedom of Choice List was Provided with Basic Dialogue that Supports the Patient's Individualized Plan of Care/Goals, Treatment Preferences and Shares the Quality Data Associated with the Providers?: Yes  Discharge Location  Discharge Placement: Skilled nursing facility

## 2021-02-15 NOTE — PROGRESS NOTES
Patient accepted and selected for [facility] and can admit today. Rapid Covid pending.

## 2021-02-15 NOTE — PROGRESS NOTES
CM spoke with facility liaison to confirm acceptance today. They would like to speak with family to confirm discharge plan after SNF and they would also like repeat Covid testing as final read was inconclusive. Order placed.

## 2021-02-15 NOTE — DISCHARGE INSTRUCTIONS
[facility]    IF YOU HAVE ANY PROBLEMS ONCE YOU ARE AT HOME CALL THE FOLLOWING NUMBERS:   Main office number: [phone] ask for Nathalia Treadwell (medical assistant with Dr. Gillermina Councilman)  Office Address: [facility] [address] [address], [address] [facility]      Patient Discharge Instructions    Enrike Carter / [id number] : 1932    Admitted 2021 Discharged: 2/15/2021         To be given to [address] on Admission         Weight bearing status: As tolerated for transfers with assistance    Activity  · Continue Physical Therapy and Occupational Therapy   · Fall precautions     Wound Care   Edy if dressing becomes saturated  [place]aved [place] are to be left in and removed in our office 2 weeks postop    Diet  · Resume regular or diabetic diet      Medications    · Patient medications are listed on the medication reconciliation sheet. Follow up    Follow up in our office in 2 weeks postop    All patients are to be transported via stretcher unless they are able to independently get out of a chair and stand without assistance. Information obtained by :  I understand that if any problems occur once I am at home I am to contact my physician. I understand and acknowledge receipt of the instructions indicated above.                                                                                                                                            Physician's or R.N.'s Signature                                                                  Date/Time                                                                                                                                              Patient or Representative Signature                                                          Date/Time

## 2021-02-15 NOTE — PROGRESS NOTES
Hospitalist Note     Admit Date:  2021  2:04 AM   Name:  Sue Liu   Age:  80 y.o.  :  1932   MRN:  [id number]   PCP:  Dmitri Manuel MD  Treatment Team: Attending Provider: Johnna Haile MD; Care Manager: Christine Forbes, RN; Hospitalist: Mariah Zapata MD; Utilization Review: Freda Gold RN; Charge Nurse: Elizabeth Mckeon RN; Student Nurse: Garrett Maher RN; Charge Nurse: Sonia Mullins; Physical Therapy Assistant: Aakash Gerber PTA    HPI/Subjective:   Ms. Argenis Villarreal is an 81 y/o female with a history of HTN, HLD, breast cancer (1970s), colon cancer (6-7 years ago) who was seen in the ER on 2/10 with complaints of right knee and right ankle pain after a mechanical fall. Her floors were just cleaned/waxed and she got up and thinks she slipped on the floor and fell onto her right leg. Plain films at that time showed a dislocated right ankle fracture along with a non-displaced fracture of the proximal fibula. The ankle was reduced successfully on follow up X-ray. She was referred to Ortho. She was admitted on  for planned ORIF of her right bimalleolar fracture. Surgery was this morning and was uncomplicated. She is seen and examined at the bedside in her room. Son, Stevan Khan, is at the bedside. She feels well. No headaches, syncope, dizziness, chest pain, palpitations, SOB/ALICEA, N/V/D, abdominal pain, peripheral edema. Does c/o RLE pain. Hospitalists consulted for medical management. 2/15: Pt seen and examined at bedside. Pt lying in bed comfortably this Am. She denies any fevers, chills, chest pain or pressure, SOB, lightheadedness, dizziness, palpitations, near syncope or syncope. Pain controlled in lower extremity. Has not had a BM, but no n/v/d, abd pain and continues to pass flatus. Denies any cough, congestion, loss of taste/smell, pleurisy. Covid test inconclusive in efforts to discharge to rehab.      No other complaints  Objective:     Patient Vitals for the past 24 hrs:   Temp Pulse Resp BP SpO2   02/15/21 0821 98.2 °F (36.8 °C) 91 18 (!) 121/50 95 %   02/15/21 0425 98.1 °F (36.7 °C) 81 18 (!) 149/69 94 %   02/15/21 0027 98.4 °F (36.9 °C) 87 18 126/78 92 %   02/14/21 1920 97.8 °F (36.6 °C) 80 18 138/68 92 %   02/14/21 1614 97.7 °F (36.5 °C) 83 18 117/70 94 %   02/14/21 1147 98.6 °F (37 °C) 93 18 (!) 158/81 99 %     Oxygen Therapy  O2 Sat (%): 95 % (02/15/21 0821)  Pulse via Oximetry: 83 beats per minute (02/12/21 1552)  O2 Device: Room air (02/12/21 1552)  O2 Flow Rate (L/min): 3 l/min (02/12/21 0858)    Estimated body mass index is 29.3 kg/m² as calculated from the following:    Height as of this encounter: 5' 5.5\" (1.664 m). Weight as of this encounter: 81.1 kg (178 lb 12.8 oz). Intake/Output Summary (Last 24 hours) at 2/15/2021 1035  Last data filed at 2/15/2021 0431  Gross per 24 hour   Intake    Output 400 ml   Net -400 ml       *Note that automatically entered I/Os may not be accurate; dependent on patient compliance with collection and accurate  by PPS. General:    Well nourished. Alert. CV:   RRR. No murmur, rub, or gallop. Lungs:   CTAB. No wheezing, rhonchi, or rales. Abdomen:   Soft, nontender, nondistended. Extremities: Warm and dry. No cyanosis or edema. RLE in hard cast from knee to ankle. Skin:     No rashes or jaundice.    Neuro:  No gross focal deficits  Psych:  Calm, normal mood and affect    Data Reviewed:  I have reviewed all labs, meds, and studies from the last 24 hours:  Recent Results (from the past 24 hour(s))   HGB & HCT    Collection Time: 02/15/21  5:13 AM   Result Value Ref Range    HGB 8.4 (L) 11.7 - 15.4 g/dL    HCT 26.1 (L) 35.8 - 46.3 %   PLEASE READ & DOCUMENT PPD TEST IN 72 HRS    Collection Time: 02/15/21  6:46 AM   Result Value Ref Range    PPD Negative Negative    mm Induration 0 0 - 5 mm        Current Meds:  Current Facility-Administered Medications   Medication Dose Route Frequency    polyethylene glycol (MIRALAX) packet 17 g  17 g Oral DAILY    amLODIPine (NORVASC) tablet 5 mg  5 mg Oral DAILY    acetaminophen (TYLENOL) tablet 650 mg  650 mg Oral Q8H    HYDROmorphone (PF) (DILAUDID) injection 0.5 mg  0.5 mg IntraVENous Q4H PRN    oxyCODONE IR (ROXICODONE) tablet 5 mg  5 mg Oral Q3H PRN    sodium chloride (NS) flush 5-40 mL  5-40 mL IntraVENous Q8H    sodium chloride (NS) flush 5-40 mL  5-40 mL IntraVENous PRN    naloxone (NARCAN) injection 0.2-0.4 mg  0.2-0.4 mg IntraVENous Q10MIN PRN    ondansetron (ZOFRAN) injection 4 mg  4 mg IntraVENous Q4H PRN    diphenhydrAMINE (BENADRYL) capsule 25 mg  25 mg Oral Q4H PRN    senna-docusate (PERICOLACE) 8.6-50 mg per tablet 2 Tab  2 Tab Oral DAILY    pravastatin (PRAVACHOL) tablet 40 mg  40 mg Oral QHS       Other Studies:  No results found for this visit on 02/12/21. No results found. All Micro Results     Procedure Component Value Units Date/Time    COVID-19 RAPID TEST [[id number]] Collected: 02/13/21 0539    Order Status: Completed Specimen: Nasopharyngeal Updated: 02/13/21 0603     Specimen source NASAL        COVID-19 rapid test Not detected        Comment:      The specimen is NEGATIVE for SARS-CoV-2, the novel coronavirus associated with COVID-19. A negative result does not rule out COVID-19. This test has been authorized by the FDA under an Emergency Use Authorization (EUA) for use by authorized laboratories.         Fact sheet for Healthcare Providers: ConventionUpdate.co.nz  Fact sheet for Patients: ConventionUpdate.co.nz       Methodology: Isothermal Nucleic Acid Amplification               SARS-CoV-2 Lab Results  \"Novel Coronavirus\" Test: No results found for: COV2NT   \"Emergent Disease\" Test: No results found for: EDPR  \"SARS-COV-2\" Test: No results found for: XGCOVT  Rapid Test:   Lab Results   Component Value Date/Time    COVR Not detected 02/13/2021 05:39 AM            Assessment and Plan:     Hospital Problems as of 2/15/2021 Date Reviewed: 2/12/2021          Codes Class Noted - Resolved POA    * (Principal) Closed right ankle fracture, initial encounter ICD-10-CM: S82.891A  ICD-9-CM: 824.8  2/12/2021 - Present Unknown        Hypertension ICD-10-CM: I10  ICD-9-CM: 401.9  2/12/2021 - Present Unknown        Hyperlipidemia ICD-10-CM: E78.5  ICD-9-CM: 272.4  2/12/2021 - Present Unknown        Breast cancer ([facility] ICD-10-CM: C50.919  ICD-9-CM: 174.9  2/12/2021 - Present Unknown        Colon cancer ([facility] ICD-10-CM: C18.9  ICD-9-CM: 153.9  2/12/2021 - Present Unknown        Frequent falls ICD-10-CM: R29.6  ICD-9-CM: V15.88  2/12/2021 - Present Unknown              Plan:  # Right bimalleolar fracture/frequent falls              - S/p ORIF with Ortho on 2/12. General mgmt per primary. Therapies, needs rehab.      # HTN   - Bp slightly increased overnight, but with some hypotension as well   - titrate back on home meds as able   - cont home norvasc and restart home ACEi     # HLD              - Resume pravastatin     # H/o breast and colon cancer     Other listed chronic conditions stable, continue current management.     Son, Shanna Parisi, can be reached at [phone].     Diet:  DIET REGULAR  DVT ppx:  Per ortho    Signed:  Abhinav Samaniego MD

## 2021-02-24 ENCOUNTER — PATIENT OUTREACH (OUTPATIENT)
Dept: CASE MANAGEMENT | Age: 86
End: 2021-02-24

## 2021-02-24 NOTE — PROGRESS NOTES
Post Acute Facility Update     *The information contained in this note was received during a weekly care coordination call with the post acute facility*    Current Facility: [facility] ([facility])  Anticipated Discharge Date:TBD    Facility Nursing Update: 2/23/2021  Resident remains alert and oriented with intermittent confusion. Minimal pain noted, relieved with scheduled APAP. Difficulty with constipation/diarrhea. Follow up with ortho this week. Facility Social Work Update: n/a  ADL's: Min/Mod Assistance  Current Level of Assistance: Minimal Assistance   Bed Mobility: Min/Mod Assistance  Transfers: Moderate Assistance   Ambulation: Dependent  How far (in feet) is the patient ambulating?  Non-ambulatory at this time per ortho/therapy  Does patient use an assistive device: yes  If yes, device used: w/c  Barriers to Discharge: TBD  Other: n/a

## 2021-03-02 ENCOUNTER — PATIENT OUTREACH (OUTPATIENT)
Dept: CASE MANAGEMENT | Age: 86
End: 2021-03-02

## 2021-03-02 NOTE — PROGRESS NOTES
Post Acute Facility Update     *The information contained in this note was received during a weekly care coordination call with the post acute facility*    Current Facility: [facility] ([facility])  Anticipated Discharge Date: TBD    Facility Nursing Update: Resident remains alert and oriented with intermittent confusion and difficulty with short term memory. Resident is ambulating short distances now with walking boot provided my ortho. Resident tested for C-diff (resulted negative). Minimal c/o pain.     Facility Social Work Update: n/a  ADL's: Minimal Assistance   Current Level of Assistance: Minimal Assistance   Bed Mobility: Minimal Assistance   Transfers: Minimal Assistance   Ambulation: Minimal Assistance   How far (in feet) is the patient ambulating? 2-3 ft  Does patient use an assistive device: yes  If yes, device used: RW  Barriers to Discharge: TBD  Other: n/a

## 2021-03-10 ENCOUNTER — PATIENT OUTREACH (OUTPATIENT)
Dept: CASE MANAGEMENT | Age: 86
End: 2021-03-10

## 2021-03-10 NOTE — PROGRESS NOTES
Post Acute Facility Update     *The information contained in this note was received during a weekly care coordination call with the post acute facility*    Current Facility: [facility] ([facility])  Anticipated Discharge Date: TBD    Facility Nursing Update: DATE: 3/9/21   Resident remains alert and oriented with periods of confusion. SLUM 23/30. SLUM continues to improve weekly. Yeast infection noted, healing well with tx. Minimal c/o pain at this time. Cast to RLE remains clean, dry and intact. Resident able to wiggle toes which are pink and warm. Facility Social Work Update:Temporary d/c date set for 3/15; awaiting results from ortho f/u on 3/11. Family made aware.     Upper Extremity Assistance: Contact Guard Assist - hands on patient for balance   Lower Extremity Assistance: Min/Mod Assistance  Bed Mobility: Stand by Assist - Presence and Cueing  Transfers: Minimal Assistance   Ambulation: Moderate Assistance   How far (in feet) is the patient ambulating? 2-3 ft  Device Used: RW  Barriers to Discharge: TBD  Other: TBD

## 2021-08-11 ENCOUNTER — APPOINTMENT (OUTPATIENT)
Dept: CT IMAGING | Age: 86
End: 2021-08-11
Attending: EMERGENCY MEDICINE
Payer: MEDICARE

## 2021-08-11 ENCOUNTER — HOSPITAL ENCOUNTER (EMERGENCY)
Age: 86
Discharge: HOME | End: 2021-08-11
Attending: EMERGENCY MEDICINE
Payer: MEDICARE

## 2021-08-11 ENCOUNTER — APPOINTMENT (OUTPATIENT)
Dept: GENERAL RADIOLOGY | Age: 86
End: 2021-08-11
Attending: EMERGENCY MEDICINE
Payer: MEDICARE

## 2021-08-11 VITALS
DIASTOLIC BLOOD PRESSURE: 94 MMHG | RESPIRATION RATE: 20 BRPM | TEMPERATURE: 97.5 F | HEART RATE: 68 BPM | OXYGEN SATURATION: 96 % | SYSTOLIC BLOOD PRESSURE: 161 MMHG

## 2021-08-11 DIAGNOSIS — S22.080A COMPRESSION FRACTURE OF T12 VERTEBRA, INITIAL ENCOUNTER (HCC): Primary | ICD-10-CM

## 2021-08-11 LAB
ALBUMIN SERPL-MCNC: 3.3 G/DL (ref 3.2–4.6)
ALBUMIN/GLOB SERPL: 0.9 {RATIO} (ref 1.2–3.5)
ALP SERPL-CCNC: 98 U/L (ref 50–136)
ALT SERPL-CCNC: 15 U/L (ref 12–65)
ANION GAP SERPL CALC-SCNC: 9 MMOL/L (ref 7–16)
APPEARANCE UR: CLEAR
AST SERPL-CCNC: 12 U/L (ref 15–37)
BACTERIA URNS QL MICRO: 0 /HPF
BASOPHILS # BLD: 0 K/UL (ref 0–0.2)
BASOPHILS NFR BLD: 0 % (ref 0–2)
BILIRUB SERPL-MCNC: 0.5 MG/DL (ref 0.2–1.1)
BILIRUB UR QL: NEGATIVE
BUN SERPL-MCNC: 60 MG/DL (ref 8–23)
CALCIUM SERPL-MCNC: 9.3 MG/DL (ref 8.3–10.4)
CASTS URNS QL MICRO: ABNORMAL /LPF
CHLORIDE SERPL-SCNC: 97 MMOL/L (ref 98–107)
CO2 SERPL-SCNC: 25 MMOL/L (ref 21–32)
COLOR UR: YELLOW
CREAT SERPL-MCNC: 1.27 MG/DL (ref 0.6–1)
DIFFERENTIAL METHOD BLD: ABNORMAL
EOSINOPHIL # BLD: 0 K/UL (ref 0–0.8)
EOSINOPHIL NFR BLD: 0 % (ref 0.5–7.8)
EPI CELLS #/AREA URNS HPF: ABNORMAL /HPF
ERYTHROCYTE [DISTWIDTH] IN BLOOD BY AUTOMATED COUNT: 13 % (ref 11.9–14.6)
GLOBULIN SER CALC-MCNC: 3.8 G/DL (ref 2.3–3.5)
GLUCOSE SERPL-MCNC: 105 MG/DL (ref 65–100)
GLUCOSE UR STRIP.AUTO-MCNC: NEGATIVE MG/DL
HCT VFR BLD AUTO: 34.7 % (ref 35.8–46.3)
HGB BLD-MCNC: 11.6 G/DL (ref 11.7–15.4)
HGB UR QL STRIP: NEGATIVE
IMM GRANULOCYTES # BLD AUTO: 0.5 K/UL (ref 0–0.5)
IMM GRANULOCYTES NFR BLD AUTO: 6 % (ref 0–5)
KETONES UR QL STRIP.AUTO: NEGATIVE MG/DL
LEUKOCYTE ESTERASE UR QL STRIP.AUTO: ABNORMAL
LIPASE SERPL-CCNC: 159 U/L (ref 73–393)
LYMPHOCYTES # BLD: 1.4 K/UL (ref 0.5–4.6)
LYMPHOCYTES NFR BLD: 19 % (ref 13–44)
MCH RBC QN AUTO: 32 PG (ref 26.1–32.9)
MCHC RBC AUTO-ENTMCNC: 33.4 G/DL (ref 31.4–35)
MCV RBC AUTO: 95.9 FL (ref 79.6–97.8)
MONOCYTES # BLD: 0.9 K/UL (ref 0.1–1.3)
MONOCYTES NFR BLD: 12 % (ref 4–12)
NEUTS SEG # BLD: 4.7 K/UL (ref 1.7–8.2)
NEUTS SEG NFR BLD: 63 % (ref 43–78)
NITRITE UR QL STRIP.AUTO: NEGATIVE
NRBC # BLD: 0 K/UL (ref 0–0.2)
OTHER OBSERVATIONS,UCOM: ABNORMAL
PH UR STRIP: 5.5 [PH] (ref 5–9)
PLATELET # BLD AUTO: 426 K/UL (ref 150–450)
PLATELET COMMENTS,PCOM: ADEQUATE
PMV BLD AUTO: 9.6 FL (ref 9.4–12.3)
POTASSIUM SERPL-SCNC: 4.3 MMOL/L (ref 3.5–5.1)
PROT SERPL-MCNC: 7.1 G/DL (ref 6.3–8.2)
PROT UR STRIP-MCNC: NEGATIVE MG/DL
RBC # BLD AUTO: 3.62 M/UL (ref 4.05–5.2)
RBC #/AREA URNS HPF: ABNORMAL /HPF
RBC MORPH BLD: ABNORMAL
SODIUM SERPL-SCNC: 131 MMOL/L (ref 136–145)
SP GR UR REFRACTOMETRY: 1.01 (ref 1–1.02)
UROBILINOGEN UR QL STRIP.AUTO: 0.2 EU/DL (ref 0.2–1)
WBC # BLD AUTO: 7.5 K/UL (ref 4.3–11.1)
WBC MORPH BLD: ABNORMAL
WBC URNS QL MICRO: ABNORMAL /HPF

## 2021-08-11 PROCEDURE — 93005 ELECTROCARDIOGRAM TRACING: CPT | Performed by: EMERGENCY MEDICINE

## 2021-08-11 PROCEDURE — 70450 CT HEAD/BRAIN W/O DYE: CPT

## 2021-08-11 PROCEDURE — 87186 SC STD MICRODIL/AGAR DIL: CPT

## 2021-08-11 PROCEDURE — 72131 CT LUMBAR SPINE W/O DYE: CPT

## 2021-08-11 PROCEDURE — 99285 EMERGENCY DEPT VISIT HI MDM: CPT

## 2021-08-11 PROCEDURE — 96374 THER/PROPH/DIAG INJ IV PUSH: CPT

## 2021-08-11 PROCEDURE — 83690 ASSAY OF LIPASE: CPT

## 2021-08-11 PROCEDURE — 74022 RADEX COMPL AQT ABD SERIES: CPT

## 2021-08-11 PROCEDURE — 81001 URINALYSIS AUTO W/SCOPE: CPT

## 2021-08-11 PROCEDURE — 74011000250 HC RX REV CODE- 250: Performed by: EMERGENCY MEDICINE

## 2021-08-11 PROCEDURE — 74176 CT ABD & PELVIS W/O CONTRAST: CPT

## 2021-08-11 PROCEDURE — 80053 COMPREHEN METABOLIC PANEL: CPT

## 2021-08-11 PROCEDURE — 87086 URINE CULTURE/COLONY COUNT: CPT

## 2021-08-11 PROCEDURE — 96375 TX/PRO/DX INJ NEW DRUG ADDON: CPT

## 2021-08-11 PROCEDURE — 74011250636 HC RX REV CODE- 250/636: Performed by: EMERGENCY MEDICINE

## 2021-08-11 PROCEDURE — 87088 URINE BACTERIA CULTURE: CPT

## 2021-08-11 PROCEDURE — 85025 COMPLETE CBC W/AUTO DIFF WBC: CPT

## 2021-08-11 RX ORDER — MORPHINE SULFATE 2 MG/ML
2 INJECTION, SOLUTION INTRAMUSCULAR; INTRAVENOUS ONCE
Status: COMPLETED | OUTPATIENT
Start: 2021-08-11 | End: 2021-08-11

## 2021-08-11 RX ORDER — AMOXICILLIN 250 MG
3 CAPSULE ORAL DAILY
Qty: 60 TABLET | Refills: 0 | Status: SHIPPED | OUTPATIENT
Start: 2021-08-11

## 2021-08-11 RX ORDER — HYDROCODONE BITARTRATE AND ACETAMINOPHEN 5; 325 MG/1; MG/1
1 TABLET ORAL
Qty: 10 TABLET | Refills: 0 | Status: SHIPPED | OUTPATIENT
Start: 2021-08-11 | End: 2021-08-14

## 2021-08-11 RX ORDER — SODIUM CHLORIDE 0.9 % (FLUSH) 0.9 %
5-10 SYRINGE (ML) INJECTION EVERY 8 HOURS
Status: DISCONTINUED | OUTPATIENT
Start: 2021-08-11 | End: 2021-08-12 | Stop reason: HOSPADM

## 2021-08-11 RX ORDER — ONDANSETRON 2 MG/ML
4 INJECTION INTRAMUSCULAR; INTRAVENOUS ONCE
Status: COMPLETED | OUTPATIENT
Start: 2021-08-11 | End: 2021-08-11

## 2021-08-11 RX ORDER — SODIUM CHLORIDE 0.9 % (FLUSH) 0.9 %
5-10 SYRINGE (ML) INJECTION AS NEEDED
Status: DISCONTINUED | OUTPATIENT
Start: 2021-08-11 | End: 2021-08-12 | Stop reason: HOSPADM

## 2021-08-11 RX ADMIN — Medication 5 ML: at 20:12

## 2021-08-11 RX ADMIN — ONDANSETRON 4 MG: 2 INJECTION INTRAMUSCULAR; INTRAVENOUS at 18:35

## 2021-08-11 RX ADMIN — FAMOTIDINE 40 MG: 10 INJECTION, SOLUTION INTRAVENOUS at 18:35

## 2021-08-11 RX ADMIN — SODIUM CHLORIDE 500 ML: 900 INJECTION, SOLUTION INTRAVENOUS at 18:34

## 2021-08-11 RX ADMIN — MORPHINE SULFATE 2 MG: 2 INJECTION, SOLUTION INTRAMUSCULAR; INTRAVENOUS at 20:11

## 2021-08-11 NOTE — ED PROVIDER NOTES
80-year-old female presenting for agitation nausea and increased confusion. She does have advanced dementia. Patient herself tells me that there is nothing wrong but then when I ask if she has some stomach trouble she tells me that she does. She keeps speaking about her son who is apparently traveling from [place]. The history is provided by the patient and the EMS personnel. Altered mental status   This is a recurrent problem. The current episode started yesterday. The problem has been gradually worsening. Associated symptoms include confusion. Pertinent negatives include no somnolence, no seizures, no unresponsiveness, no weakness, no delusions, no hallucinations, no self-injury, no violence, no tingling and no numbness. Mental status baseline is severe dementia. Risk factors include dementia.    Abdominal Pain          Past Medical History:   Diagnosis Date    [facility] [address]     [facility] cancer [facility] [address]     HLD (hyperlipidemia)     Hypertension        Past Surgical History:   Procedure Laterality Date    HX ANKLE FRACTURE TX Right 02/12/2021    HX MASTECTOMY Right 1970s    HX TOTAL COLECTOMY           Family History:   Problem Relation Age of Onset    No Known Problems Mother     No Known Problems Father        Social History     Socioeconomic History    Marital status:      Spouse name: Not on file    Number of children: Not on file    Years of education: Not on file    Highest education level: Not on file   Occupational History    Not on file   Tobacco Use    Smoking status: Never Smoker    Smokeless tobacco: Never Used   Vaping Use    Vaping Use: Never used   Substance and Sexual Activity    Alcohol use: Not Currently    Drug use: Never    Sexual activity: Not Currently   Other Topics Concern    Not on file   Social History Narrative    Not on file     Social Determinants of Health     Financial Resource Strain:     Difficulty of Paying Living Expenses:    Food Insecurity:     Worried About Running Out of Food in the Last Year:     [address] in the Last Year:    Transportation Needs:     Lack of Transportation (Medical):  Lack of Transportation (Non-Medical):    Physical Activity:     Days of Exercise per Week:     Minutes of Exercise per Session:    Stress:     Feeling of Stress :    Social Connections:     Frequency of Communication with Friends and Family:     Frequency of Social Gatherings with Friends and Family:     Attends Yars[employer]ism Services:     Active Member of Clubs or Organizations:     Attends Club or Organization Meetings:     Marital Status:    Intimate Partner Violence:     Fear of Current or Ex-Partner:     Emotionally Abused:     Physically Abused:     Sexually Abused: ALLERGIES: Morphine    Review of Systems   Gastrointestinal: Positive for abdominal pain. Neurological: Negative for tingling, seizures, weakness and numbness. Psychiatric/Behavioral: Positive for confusion. Negative for hallucinations and self-injury. All other systems reviewed and are negative. Vitals:    08/11/21 1709 08/11/21 1711 08/11/21 1729   BP: 137/85 (!) 161/94    Pulse: 77  76   Resp: 16     Temp: 97.5 °F (36.4 °C)     SpO2: 97% 97% 95%            Physical Exam  Vitals and nursing note reviewed. Constitutional:       Appearance: She is well-developed. HENT:      Head: Normocephalic and atraumatic. Eyes:      Conjunctiva/sclera: Conjunctivae normal.      Pupils: Pupils are equal, round, and reactive to light. Cardiovascular:      Rate and Rhythm: Normal rate and regular rhythm. Pulmonary:      Effort: Pulmonary effort is normal.      Breath sounds: Normal breath sounds. Abdominal:      General: Bowel sounds are normal.      Palpations: Abdomen is soft. Tenderness: There is abdominal tenderness. Musculoskeletal:         General: Normal range of motion. Cervical back: Neck supple.    Skin:     General: Skin is warm and dry. Neurological:      Mental Status: She is alert and oriented to person, place, and time. Psychiatric:         Mood and Affect: Mood is anxious. Affect is tearful. Speech: Speech is tangential.          MDM  Number of Diagnoses or Management Options  Compression fracture of T12 vertebra, initial encounter [facility])  Diagnosis management comments: 27-year-old female presenting for back pain, abdominal pain and some increased agitation. Her son states that her mental status is baseline for her. Amount and/or Complexity of Data Reviewed  Clinical lab tests: ordered and reviewed (Results for orders placed or performed during the hospital encounter of 08/11/21  -CBC WITH AUTOMATED DIFF       Result                      Value             Ref Range           WBC                         7.5               4.3 - 11.1 K*       RBC                         3.62 (L)          4.05 - 5.2 M*       HGB                         11.6 (L)          11.7 - 15.4 *       HCT                         34.7 (L)          35.8 - 46.3 %       MCV                         95.9              79.6 - 97.8 *       MCH                         32.0              26.1 - 32.9 *       MCHC                        33.4              31.4 - 35.0 *       RDW                         13.0              11.9 - 14.6 %       PLATELET                    426               150 - 450 K/*       MPV                         9.6               9.4 - 12.3 FL       ABSOLUTE NRBC               0.00              0.0 - 0.2 K/*       NEUTROPHILS                 63                43 - 78 %           LYMPHOCYTES                 19                13 - 44 %           MONOCYTES                   12                4.0 - 12.0 %        EOSINOPHILS                 0 (L)             0.5 - 7.8 %         BASOPHILS                   0                 0.0 - 2.0 %         IMMATURE GRANULOCYTES       6 (H)             0.0 - 5.0 %         ABS.  NEUTROPHILS            4.7 1.7 - 8.2 K/*       ABS. LYMPHOCYTES            1.4               0.5 - 4.6 K/*       ABS. MONOCYTES              0.9               0.1 - 1.3 K/*       ABS. EOSINOPHILS            0.0               0.0 - 0.8 K/*       ABS. BASOPHILS              0.0               0.0 - 0.2 K/*       ABS. IMM. GRANS.            0.5               0.0 - 0.5 K/*       RBC COMMENTS                                                  NORMOCYTIC/NORMOCHROMIC       WBC COMMENTS                                                  Result Confirmed By Smear       PLATELET COMMENTS           ADEQUATE                              DF                          AUTOMATED                        -METABOLIC PANEL, COMPREHENSIVE       Result                      Value             Ref Range           Sodium                      131 (L)           136 - 145 mm*       Potassium                   4.3               3.5 - 5.1 mm*       Chloride                    97 (L)            98 - 107 mmo*       CO2                         25                21 - 32 mmol*       Anion gap                   9                 7 - 16 mmol/L       Glucose                     105 (H)           65 - 100 mg/*       BUN                         60 (H)            8 - 23 MG/DL        Creatinine                  1.27 (H)          0.6 - 1.0 MG*       GFR est AA                  51 (L)            >60 ml/min/1*       GFR est non-AA              42 (L)            >60 ml/min/1*       Calcium                     9.3               8.3 - 10.4 M*       Bilirubin, total            0.5               0.2 - 1.1 MG*       ALT (SGPT)                  15                12 - 65 U/L         AST (SGOT)                  12 (L)            15 - 37 U/L         Alk.  phosphatase            98                50 - 136 U/L        Protein, total              7.1               6.3 - 8.2 g/*       Albumin                     3.3               3.2 - 4.6 g/*       Globulin                    3.8 (H)           2.3 - 3.5 g/*       A-G Ratio                   0.9 (L)           1.2 - 3.5      -LIPASE       Result                      Value             Ref Range           Lipase                      159               73 - 393 U/L   -URINALYSIS W/ RFLX MICROSCOPIC       Result                      Value             Ref Range           Color                       YELLOW                                Appearance                  CLEAR                                 Specific gravity            1.013             1.001 - 1.02*       pH (UA)                     5.5               5.0 - 9.0           Protein                     Negative          NEG mg/dL           Glucose                     Negative          mg/dL               Ketone                      Negative          NEG mg/dL           Bilirubin                   Negative          NEG                 Blood                       Negative          NEG                 Urobilinogen                0.2               0.2 - 1.0 EU*       Nitrites                    Negative          NEG                 Leukocyte Esterase          TRACE (A)         NEG                 WBC                         3-5               0 /hpf              RBC                         0-3               0 /hpf              Epithelial cells            3-5               0 /hpf              Bacteria                    0                 0 /hpf              Casts                       0-3               0 /lpf              Other observations                                            RESULTS VERIFIED MANUALLY  -EKG, 12 LEAD, INITIAL       Result                      Value             Ref Range           Ventricular Rate            68                BPM                 Atrial Rate                 68                BPM                 P-R Interval                172               ms                  QRS Duration                84                ms                  Q-T Interval                406               ms QTC Calculation (Bezet)     431               ms                  Calculated P Axis           34                degrees             Calculated R Axis           11                degrees             Calculated T Axis           49                degrees             Diagnosis                                                     !! AGE AND GENDER SPECIFIC ECG ANALYSIS !! Sinus rhythm with sinus arrhythmia   Normal ECG   No previous ECGs available     )  Tests in the radiology section of CPT®: ordered and reviewed (XR ABD ACUTE W 1 V CHEST    Result Date: 8/11/2021  Abdominal Series CLINICAL INDICATION:  Acute worsening confusion, altered mental status, abdominal distention, weakness, malaise and fatigue COMPARISON: None TECHNIQUE: A frontal upright view of the chest and supine and upright views of the abdomen were obtained. FINDINGS: The lungs are well inflated. No evidence of infiltrate or effusion. Mediastinal contours are unremarkable. Surgical clips project over right axilla. Flat and upright views of the abdomen show a nonspecific bowel gas pattern but no overt obstruction. No evidence of free air. Innumerable surgical clips and probable hernia repair apparatus projected over the abdomen. . Bone density is low throughout. There are age commensurate degenerative changes and scoliosis of the spine. Partial visualization of right hip arthroplasty hardware. No acute radiographic abnormality. CT HEAD WO CONT    Result Date: 8/11/2021  Noncontrast head CT Clinical Indication: Acute worsening of confusion, altered mental status, diminished responsiveness, dementia worse than baseline. Now unable to walk and nauseated. Technique: Noncontrast axial images were obtained through the brain.  All CT scans at this location are performed using dose modulation techniques as appropriate including the following: Automated exposure control, adjustment of the MA and/or kV according to patient's size, or use of iterative reconstruction technique. Reformatted coronal images obtained to further evaluate bones, soft tissues, extra-axial spaces. Comparison: None available Findings: There is no acute intracranial hemorrhage, hydrocephalus, intra-axial mass, or mass-effect. There is no CT evidence of acute large artery territorial infarction or abnormal extra-axial fluid collection. The mastoid air cells and paranasal sinuses are clear where imaged. No displaced skull fractures are present. No CT evidence of acute intracranial abnormality. CT SPINE LUMB WO CONT    Result Date: 8/11/2021  CT of the lumbar spine without INDICATION:  Acute moderate nausea, bilateral back pain, difficulty walking, flank pain COMPARISON: Noncontrast abdominopelvic CT and radiography earlier today TECHNIQUE: Dose reduction technique used: Automated exposure Control and/or adjustment of mA and kV according to patient size. Multiple axial images were obtained through the lumbar spine without intravenous contrast. Coronal and sagittal reformatted images were also performed for further evaluation of osseous structures. FINDINGS:  There is a mild compression fracture at T12 (less than 20% vertebral body height loss) with no evidence of a displaced fragment or retropulsion. No acute osseous lesion elsewhere. Low bone density, multilevel chronic age commensurate diffuse degenerative changes and disc disease noted. T12 vertebral compression fracture. CT ABD/PEL FOR RENAL STONE    Result Date: 8/11/2021  CT abdomen and pelvis without contrast CLINICAL INDICATION: Acute worsening weakness, malaise and fatigue, difficulty walking, generalized abdominal distention, bilateral moderate low back and flank pain, nausea. History of large bowel resection, breast cancer, colon cancer, hypertension. Labs demonstrate Anemia, hyponatremia renal insufficiency. COMPARISON: Abdominal radiographic series earlier today TECHNIQUE: Dose reduction technique used:  Automated exposure Control and/or adjustment of mA and kV according to patient size. Multiple contiguous axial CT images were obtained through the abdomen and pelvis without intravenous or oral contrast. Coronal reformatted images are obtained to further evaluate organs. FINDINGS: Partially included lung bases demonstrate mild atelectasis in the periphery of both lower lungs but no consolidation or effusion. Low attenuation of vascular lumens is compatible with anemia. Abdomen: Left kidney is absent. No right ureteral or renal calculi. There is no hydronephrosis. There is no evidence of renal mass on limited non-contrast evaluation. No discrete lesions are seen in the noncontrasted visualized portions of the liver or spleen. Hypodense 1 cm left adrenal nodule is likely benign, but not fully characterized here. The adrenals and pancreas demonstrate no acute abnormality. Gallbladder is absent, with no complication evident. No free air. No focal inflammatory changes or fluid collections. Aorta normal caliber. Diffuse calcifications are noted of the aorta and multiple branches. Vessels are not well evaluated on this exam without contrast. There is nonspecific mild prominence of the duodenum with questionable slight wall thickening and surrounding inflammation. Additionally there are multiple borderline dilated nonspecific loops of small bowel in the mid and left abdomen. No clear transition point is identified and the appearance is not suggestive of high-grade obstruction. GI tract not well evaluated on this exam without oral contrast. Partial resection of bowel is evident. There is thinning of the anterior abdominal wall with scarring, diastases recti, hernia repair but no acute complication. Appendix is not seen, likely surgically absent. Pelvis CT: Streak artifact from right hip hardware limits visualization and evaluation of nearby pelvic structures. The uterus and ovaries are not abnormally enlarged.  Are no distal ureteral or bladder calculi. No focal inflammatory changes or fluid collections in the pelvis. No evidence of lymphadenopathy. Bones: There is a mild compression fracture at T12 with no evidence of a displaced fragment No acute osseous lesion elsewhere. Low bone density, chronic age commensurate diffuse degenerative changes noted. 1. No right hydronephrosis or stone. Absent left kidney. 2. Several nonspecific small bowel loops could indicate ileus, inflammation or partial obstruction. GI detail is limited given lack of oral contrast, and prior partial resection of GI tract. 3. Mild T12 vertebral compression fracture. No displacement or retropulsion. 4. Cholecystectomy.     )  Tests in the medicine section of CPT®: ordered and reviewed  Obtain history from someone other than the patient: yes (Spoke with the patient's son)  Independent visualization of images, tracings, or specimens: yes    Risk of Complications, Morbidity, and/or Mortality  Presenting problems: high  Diagnostic procedures: high  Management options: moderate  General comments: Patient's work-up has been reassuring. Vital signs and blood work are at her baseline. Urinalysis shows small leuk esterase but no white cells and no bacteria and she was recently treated. CT showed nothing acute in the abdomen except some mildly dilated loops of bowel but the patient's son tells me she has had 2 large bowel movements over the past couple of days so he is not concerned about obstruction. There was a small T12 compression fracture with no retropulsion. Sending patient home with some pain medication for this given complaints of back pain as well as stool softeners to avoid constipation with these pain meds. Orthopedics follow-up as needed. I personally reviewed the patient's vital signs, laboratory tests, and/or radiological findings. I discussed these findings with the patient and their significance.   I answered all questions and gave the patient clear return precautions. The patient was discharged from the emergency department in stable condition        Patient Progress  Patient progress: improved    ED Course as of Aug 11 1913   Wed Aug 11, 2021   [address] Patient's imaging was interpreted as normal    [JS]   1846 Patient's son is now at the bedside and states that her mental status seems to be her baseline.     [JS]   1901 EKG was performed and shows sinus rhythm rate 68, left axis deviation, low voltage, [place] is 172, QRS is 84, QTc is 431 no STEMI    [JS]      ED Course User Index  [JS] Jaycee Gaxiola MD       Procedures

## 2021-08-11 NOTE — ED TRIAGE NOTES
Pt arrives by EMS, they state she has baseline dementia but more confused than normal. Were called out for abd pain but pt denies any pain. Arrives from [place], normally able to walk with walker but staff states she cannot ambulate with walker. Pt stated she is nauseous in triage. Mask applied to pt.

## 2021-08-12 LAB
ATRIAL RATE: 68 BPM
CALCULATED P AXIS, ECG09: 34 DEGREES
CALCULATED R AXIS, ECG10: 11 DEGREES
CALCULATED T AXIS, ECG11: 49 DEGREES
DIAGNOSIS, 93000: NORMAL
P-R INTERVAL, ECG05: 172 MS
Q-T INTERVAL, ECG07: 406 MS
QRS DURATION, ECG06: 84 MS
QTC CALCULATION (BEZET), ECG08: 431 MS
VENTRICULAR RATE, ECG03: 68 BPM

## 2021-08-12 NOTE — DISCHARGE INSTRUCTIONS
Her CAT scan shows nothing particularly concerning in the abdomen but it does show a small T12 compression fracture which could explain some of her back pain. The remainder of her blood work and urine were clear. Sending her home with a stronger pain medication to use mostly at nighttime along with stool softeners to prevent constipation. She can follow-up with orthopedics about the compression fracture but there is nothing to really be done surgically as a pain control issue and managed conservatively.

## 2021-08-12 NOTE — ED NOTES
I have reviewed discharge instructions with the son. The son verbalized understanding. Patient left ED via Discharge Method: [facility] Opportunity for questions and clarification provided. Patient given 2 scripts. To continue your aftercare when you leave the hospital, you may receive an automated call from our care team to check in on how you are doing. This is a free service and part of our promise to provide the best care and service to meet your aftercare needs.  If you have questions, or wish to unsubscribe from this service please call [phone]. Thank you for Choosing our [facility] Emergency Department. Copy of chart and discharge instructions sent to facility.

## 2021-08-15 RX ORDER — SULFAMETHOXAZOLE AND TRIMETHOPRIM 800; 160 MG/1; MG/1
1 TABLET ORAL 2 TIMES DAILY
Qty: 14 TABLET | Refills: 0 | Status: SHIPPED | OUTPATIENT
Start: 2021-08-15 | End: 2021-08-22

## 2021-08-15 NOTE — PROGRESS NOTES
Patient's son states patient lives at the [facility] and they need to be notified of result. I spoke with a nurse at the [facility] and was told to fax the order for antibiotic to them. Sent fax to [phone] for bactrim.

## 2021-08-16 LAB
BACTERIA SPEC CULT: ABNORMAL
SERVICE CMNT-IMP: ABNORMAL

## 2021-09-18 ENCOUNTER — HOSPITAL ENCOUNTER (OUTPATIENT)
Dept: LAB | Age: 86
Discharge: HOME | End: 2021-09-18

## 2022-01-10 ENCOUNTER — HOSPITAL ENCOUNTER (EMERGENCY)
Age: 87
Discharge: HOME | End: 2022-01-11
Attending: STUDENT IN AN ORGANIZED HEALTH CARE EDUCATION/TRAINING PROGRAM
Payer: MEDICARE

## 2022-01-10 VITALS
HEIGHT: 66 IN | BODY MASS INDEX: 28.7 KG/M2 | DIASTOLIC BLOOD PRESSURE: 71 MMHG | RESPIRATION RATE: 18 BRPM | OXYGEN SATURATION: 97 % | SYSTOLIC BLOOD PRESSURE: 167 MMHG | WEIGHT: 178.57 LBS | HEART RATE: 64 BPM | TEMPERATURE: 98.1 F

## 2022-01-10 DIAGNOSIS — S02.2XXA CLOSED FRACTURE OF NASAL BONE, INITIAL ENCOUNTER: Primary | ICD-10-CM

## 2022-01-10 PROCEDURE — 99283 EMERGENCY DEPT VISIT LOW MDM: CPT

## 2022-01-11 ENCOUNTER — APPOINTMENT (OUTPATIENT)
Dept: GENERAL RADIOLOGY | Age: 87
End: 2022-01-11
Attending: STUDENT IN AN ORGANIZED HEALTH CARE EDUCATION/TRAINING PROGRAM
Payer: MEDICARE

## 2022-01-11 ENCOUNTER — APPOINTMENT (OUTPATIENT)
Dept: CT IMAGING | Age: 87
End: 2022-01-11
Attending: STUDENT IN AN ORGANIZED HEALTH CARE EDUCATION/TRAINING PROGRAM
Payer: MEDICARE

## 2022-01-11 PROCEDURE — 70486 CT MAXILLOFACIAL W/O DYE: CPT

## 2022-01-11 PROCEDURE — 72170 X-RAY EXAM OF PELVIS: CPT

## 2022-01-11 PROCEDURE — 70450 CT HEAD/BRAIN W/O DYE: CPT

## 2022-01-11 NOTE — DISCHARGE INSTRUCTIONS
Arrange follow-up for recheck with the ENT specialist listed within the next week. Avoid forceful nose blowing. Apply ice to bruised, swollen areas for help with pain.   Return for worsening symptoms, concerns or questions

## 2022-01-11 NOTE — ED TRIAGE NOTES
Pt from [facility] slipped and fell to floor while being transferred from commode to bed. Pt states no pain.  Pt is currently being treated for a uti

## 2022-01-11 NOTE — ED PROVIDER NOTES
27-year-old female patient from local nursing [facility] presents with reports of witnessed fall while transitioning from bed to the commode. Patient was reportedly in a lift of some sort when the strap came loose causing her to fall. Family at bedside is unsure of exactly how patient landed however it was witnessed and there is no reported loss of consciousness. Family reports new bruise over patient's right cheek. Patient denies focal pain at this time. She does not take blood thinners. She is completely bedbound and gets around with the assistance of a wheelchair. She is at her baseline mentation    The history is provided by the patient and a relative.    Fall         Past Medical History:   Diagnosis Date    Breast cancer ([facility] [address]     Colon cancer ([facility] [address]     HLD (hyperlipidemia)     Hypertension        Past Surgical History:   Procedure Laterality Date    HX ANKLE FRACTURE TX Right 02/12/2021    HX MASTECTOMY Right 1970s    HX TOTAL COLECTOMY           Family History:   Problem Relation Age of Onset    No Known Problems Mother     No Known Problems Father        Social History     Socioeconomic History    Marital status:      Spouse name: Not on file    Number of children: Not on file    Years of education: Not on file    Highest education level: Not on file   Occupational History    Not on file   Tobacco Use    Smoking status: Never Smoker    Smokeless tobacco: Never Used   Vaping Use    Vaping Use: Never used   Substance and Sexual Activity    Alcohol use: Not Currently    Drug use: Never    Sexual activity: Not Currently   Other Topics Concern    Not on file   Social History Narrative    Not on file     Social Determinants of Health     Financial Resource Strain:     Difficulty of Paying Living Expenses: Not on file   Food Insecurity:     Worried About Running Out of Food in the Last Year: Not on file    Odalys of Food in the Last Year: Not on file   Transportation Needs:  Lack of Transportation (Medical): Not on file    Lack of Transportation (Non-Medical): Not on file   Physical Activity:     Days of Exercise per Week: Not on file    Minutes of Exercise per Session: Not on file   Stress:     Feeling of Stress : Not on file   Social Connections:     Frequency of Communication with Friends and Family: Not on file    Frequency of Social Gatherings with Friends and Family: Not on file    Attends Protestant Services: Not on file    Active Member of [address] [place] [address] or Organizations: Not on file    Attends Club or Organization Meetings: Not on file    Marital Status: Not on file   Intimate Partner Violence:     Fear of Current or Ex-Partner: Not on file    Emotionally Abused: Not on file    Physically Abused: Not on file    Sexually Abused: Not on file   Housing Stability:     Unable to Pay for Housing in the Last Year: Not on file    Number of [place] in the Last Year: Not on file    Unstable Housing in the Last Year: Not on file         ALLERGIES: Morphine    Review of Systems   Unable to perform ROS: Dementia   Musculoskeletal: Positive for arthralgias. All other systems reviewed and are negative. Vitals:    01/10/22 2328   BP: (!) 167/71   Pulse: 64   Resp: 18   Temp: 98.1 °F (36.7 °C)   SpO2: 97%   Weight: 81 kg (178 lb 9.2 oz)   Height: 5' 5.5\" (1.664 m)            Physical Exam  Vitals and nursing note reviewed. Constitutional:       General: She is not in acute distress. Appearance: She is well-developed. She is not diaphoretic. Comments: Elderly, pleasantly confused female patient, alert and oriented to person place and time. No acute distress, speaks in clear, fluid sentences. HENT:      Head: Normocephalic. Comments: There is a quarter size bruise over the patient's right cheek. Faint ecchymosis over the middle of the forehead.   There is no other evidence of trauma about the head or face including, no hemotympanum or servin sign, no raccoon eyes. Right Ear: Tympanic membrane, ear canal and external ear normal.      Left Ear: Tympanic membrane, ear canal and external ear normal.      Nose: Nose normal.   Eyes:      Pupils: Pupils are equal, round, and reactive to light. Cardiovascular:      Rate and Rhythm: Normal rate and regular rhythm. Heart sounds: Normal heart sounds. No murmur heard. No friction rub. No gallop. Pulmonary:      Effort: Pulmonary effort is normal. No respiratory distress. Breath sounds: Normal breath sounds. No stridor. No decreased breath sounds, wheezing, rhonchi or rales. Chest:      Chest wall: No tenderness. Abdominal:      General: There is no distension. Palpations: Abdomen is soft. There is no mass. Tenderness: There is no abdominal tenderness. There is no guarding or rebound. Hernia: No hernia is present. Musculoskeletal:         General: No tenderness or deformity. Normal range of motion. Cervical back: Normal range of motion. Comments: Patient is stiff on range of motion testing in the lower extremities though denies significant pain with internal, external rotation of the hips bilaterally or range of motion testing in general.    Reports some generalized soreness in the right upper extremity without focal finding on my exam.    Pulses intact in all 4 extremities   Skin:     General: Skin is warm and dry. Neurological:      Mental Status: She is alert and oriented to person, place, and time. Cranial Nerves: No cranial nerve deficit. MDM  Number of Diagnoses or Management Options  Diagnosis management comments:  Will obtain CT imaging the head and face as well as plain film AP pelvis       Amount and/or Complexity of Data Reviewed  Tests in the radiology section of CPT®: ordered and reviewed    Risk of Complications, Morbidity, and/or Mortality  Presenting problems: moderate  Diagnostic procedures: low  Management options: moderate    Patient Progress  Patient progress: stable         Procedures

## 2022-01-11 NOTE — ED NOTES
TRANSFER - OUT REPORT:    Verbal report given to JUAN J Burgess on Jessica Brain  being transferred to [facility] for routine progression of care       Report consisted of patients Situation, Background, Assessment and   Recommendations(SBAR). Information from the following report(s) ED Summary was reviewed with the receiving nurse. Lines:       Opportunity for questions and clarification was provided.       Patient transported with:   [facility]

## 2022-01-11 NOTE — ED NOTES
Son arrived and checked out pt. States he was just there to see her today. States bruise on right cheek and right shin are new. Pt stated to son that nothing hurts.

## (undated) DEVICE — BIT DRL 230/200MM CALIB DIA2.5MM 3 FLUT QUIK CPL

## (undated) DEVICE — GAUZE,SPONGE,4"X4",16PLY,STRL,LF,10/TRAY: Brand: MEDLINE

## (undated) DEVICE — PAD,ABDOMINAL,5"X9",ST,LF,25/BX: Brand: MEDLINE INDUSTRIES, INC.

## (undated) DEVICE — PREP SKN CHLRAPRP APL 26ML STR --

## (undated) DEVICE — 2.5MM DRILL BIT/QC/GOLD/110MM

## (undated) DEVICE — DRAPE C-ARMOUR C-ARM KIT --

## (undated) DEVICE — Device

## (undated) DEVICE — 7 DAY SILVER-COATED ANTIMICROBIAL BARRIER DRESSING: Brand: ACTICOAT 7  4" X 5"

## (undated) DEVICE — DRAPE,U/SHT,SPLIT,FILM,60X84,STERILE: Brand: MEDLINE

## (undated) DEVICE — TAP SURG L110MM QUIK CPL FOR 3.5MM SCR

## (undated) DEVICE — INTENDED FOR TISSUE SEPARATION, AND OTHER PROCEDURES THAT REQUIRE A SHARP SURGICAL BLADE TO PUNCTURE OR CUT.: Brand: BARD-PARKER ® STAINLESS STEEL BLADES

## (undated) DEVICE — REM POLYHESIVE ADULT PATIENT RETURN ELECTRODE: Brand: VALLEYLAB

## (undated) DEVICE — LOWER EXTREMITY: Brand: MEDLINE INDUSTRIES, INC.

## (undated) DEVICE — BUTTON SWITCH PENCIL BLADE ELECTRODE, HOLSTER: Brand: EDGE

## (undated) DEVICE — DRAPE SHT 3 QTR PROXIMA 53X77 --